# Patient Record
Sex: FEMALE | Race: WHITE | Employment: OTHER | URBAN - METROPOLITAN AREA
[De-identification: names, ages, dates, MRNs, and addresses within clinical notes are randomized per-mention and may not be internally consistent; named-entity substitution may affect disease eponyms.]

---

## 2017-12-26 ENCOUNTER — OFFICE VISIT (OUTPATIENT)
Dept: ORTHOPEDIC SURGERY | Age: 67
End: 2017-12-26

## 2017-12-26 VITALS
WEIGHT: 239 LBS | SYSTOLIC BLOOD PRESSURE: 137 MMHG | DIASTOLIC BLOOD PRESSURE: 74 MMHG | BODY MASS INDEX: 40.8 KG/M2 | OXYGEN SATURATION: 98 % | HEIGHT: 64 IN | HEART RATE: 59 BPM

## 2017-12-26 DIAGNOSIS — M25.561 PAIN IN BOTH KNEES, UNSPECIFIED CHRONICITY: Primary | ICD-10-CM

## 2017-12-26 DIAGNOSIS — M25.562 PAIN IN BOTH KNEES, UNSPECIFIED CHRONICITY: Primary | ICD-10-CM

## 2017-12-26 PROBLEM — E66.01 OBESITY, MORBID (HCC): Status: ACTIVE | Noted: 2017-12-26

## 2017-12-26 RX ORDER — MELOXICAM 15 MG/1
15 TABLET ORAL DAILY
Qty: 30 TAB | Refills: 1 | Status: SHIPPED | OUTPATIENT
Start: 2017-12-26 | End: 2018-05-15 | Stop reason: SDUPTHER

## 2017-12-26 NOTE — PROGRESS NOTES
HISTORY OF PRESENT ILLNESS: Ms. Nataly Jain is a 59-year-old female who is here for consultation regarding bilateral knee pain. She points to pain in both knees. She thinks her right knee may be a little bit worse than her left. She has had some pain with walking, as well as going up and down steps. She states when she gets to a curb she sometimes has to lean on her grandchildren to get up and down the curb. She points to pain in the anterior, as well as medial and lateral aspect of both knees. She has not noticed swelling of her knees. There is no history of trauma. She has not noticed any deformity of her knees. She does utilize a cane intermittently and currently has a cane in her car if she needs it. She does not wear a brace on her knees. She has been taking some Tylenol for discomfort, which she thinks helps just a little bit but not a lot. She denies mechanical locking symptoms of her knees. She has not had physical therapy for her knees. PHYSICAL EXAMINATION:  Reveals an overweight, 59-year-old female in mild discomfort. She ambulates with a slight waddling gait. She does not use any ambulatory assist. With reference to her right knee, she does not have any significant deformity of the right knee. She has a fairly good range of motion of the right knee but lacks about 3 to 4? of full active extension of her right knee. She has further flexion to about 130? Dixie Riser She has palpable medial and lateral joint line tenderness. However, Jazmines test is negative. She has crepitus in the patellofemoral area of the right knee with flexion/extension. She has good collateral as well as cruciate ligament stability of the right knee. Lachman test is negative. She has no opening with varus and valgus stress testing of her right knee. With reference to her left knee, she has almost full extension of her left knee and has further flexion to about 130 to 135? Dixie Riser She has no obvious deformity of her left knee.  She has palpable medial and lateral joint line tenderness. However, Jazmines test is negative. She has good collateral as well as cruciate ligament stability of the left knee. Lachman test is negative. She has no opening medially or laterally to varus or valgus stress testing. She has mild crepitus in the patellofemoral area of the left knee with flexion/extension. Patellar tracking is satisfactory bilaterally. Clinical examination of both hips reveals that she has a good passive range of motion of both hips without discomfort. Right and left hip roll test are negative. Leg lengths are symmetrical in the supine position. RADIOGRAPHS: X-rays of both knees today reveal mild osteoarthritis of both knees. She has slight narrowing of the medial joint space on standing AP radiographs but still has adequate joint space remaining in the weightbearing portion of both knees. IMPRESSION: Mild osteoarthritis of both knees. RECOMMENDATIONS:  Treatment at this point remains symptomatic and conservative. She has not been on arthritis medicine yet. I will start her on Mobic 15 mg po q day with food. I would like to check her back in about to three to four weeks to see if this medicine has helped her. I may recommend also a course of physical therapy to try to strengthen her legs so that she is able to go up and down curbs and steps a little bit easier also. Ultimately, she may be a candidate for injections of her knees if her symptoms do not improve. There is no indication at this point for surgery with reference to her knees. All of her questions were answered to her satisfaction today.                     Vitals:    12/26/17 1140   BP: 137/74   Pulse: (!) 59   SpO2: 98%   Weight: 239 lb (108.4 kg)   Height: 5' 4\" (1.626 m)       Patient Active Problem List   Diagnosis Code    Obesity, morbid ([facility] [address] E66.01     Patient Active Problem List    Diagnosis Date Noted    Obesity, morbid ([facility] [address] 12/26/2017     Current Outpatient Prescriptions   Medication Sig Dispense Refill    VITAMIN D2 50,000 unit capsule       bisoprolol-hydrochlorothiazide (ZIAC) 10-6.25 mg per tablet       levothyroxine (SYNTHROID) 175 mcg tablet       sertraline (ZOLOFT) 100 mg tablet       letrozole (FEMARA) 2.5 mg tablet Take 2.5 mg by mouth daily. No Known Allergies  Past Medical History:   Diagnosis Date    Breast cancer ([facility] [address]      Past Surgical History:   Procedure Laterality Date    HX ANKLE FRACTURE TX      HX GASTRIC BYPASS      HX PARTIAL THYROIDECTOMY      SURGERY OF BREAST CAPSULE       History reviewed. No pertinent family history.   Social History   Substance Use Topics    Smoking status: Never Smoker    Smokeless tobacco: Never Used    Alcohol use 0.0 oz/week     0 Standard drinks or equivalent per week

## 2017-12-26 NOTE — PATIENT INSTRUCTIONS
Follow up in 3-4 weeks  [facility]           Joint Pain: Care Instructions  Your Care Instructions    Many people have small aches and pains from overuse or injury to muscles and joints. Joint injuries often happen during sports or recreation, work tasks, or projects around the home. An overuse injury can happen when you put too much stress on a joint or when you do an activity that stresses the joint over and over, such as using the computer or rowing a boat. You can take action at home to help your muscles and joints get better. You should feel better in 1 to 2 weeks, but it can take 3 months or more to heal completely. Follow-up care is a key part of your treatment and safety. Be sure to make and go to all appointments, and call your doctor if you are having problems. It's also a good idea to know your test results and keep a list of the medicines you take. How can you care for yourself at home? · Do not put weight on the injured joint for at least a day or two. · For the first day or two after an injury, do not take hot showers or baths, and do not use hot packs. The heat could make swelling worse. · Put ice or a cold pack on the sore joint for 10 to 20 minutes at a time. Try to do this every 1 to 2 hours for the next 3 days (when you are awake) or until the swelling goes down. Put a thin cloth between the ice and your skin. · Wrap the injury in an elastic bandage. Do not wrap it too tightly because this can cause more swelling. · Prop up the sore joint on a pillow when you ice it or anytime you sit or lie down during the next 3 days. Try to keep it above the level of your heart. This will help reduce swelling. · Take an over-the-counter pain medicine, such as acetaminophen (Tylenol), ibuprofen (Advil, Motrin), or naproxen (Aleve). Read and follow all instructions on the label. · After 1 or 2 days of rest, begin moving the joint gently.  While the joint is still healing, you can begin to exercise using activities that do not strain or hurt the painful joint. When should you call for help? Call your doctor now or seek immediate medical care if:  ? · You have signs of infection, such as:  ¨ Increased pain, swelling, warmth, and redness. ¨ Red streaks leading from the joint. ¨ A fever. ? Watch closely for changes in your health, and be sure to contact your doctor if:  ? · Your movement or symptoms are not getting better after 1 to 2 weeks of home treatment. Where can you learn more? Go to http://derrell-nery.info/. Enter P205 in the search box to learn more about \"Joint Pain: Care Instructions. \"  Current as of: March 21, 2017  Content Version: 11.4  © 7489-8788 [employer]. Care instructions adapted under license by Is[employer] (which disclaims liability or warranty for this information). If you have questions about a medical condition or this instruction, always ask your healthcare professional. [place] [address] any warranty or liability for your use of this information.

## 2017-12-26 NOTE — MR AVS SNAPSHOT
Visit Information Date & Time Provider Department Dept. Phone Encounter #  
 12/26/2017 11:30 AM Elizabeth Trevizo MD 4 [facility], [address] and [facility] [place] [phone] [id number] Upcoming Health Maintenance Date Due Hepatitis C Screening 1950 DTaP/Tdap/Td series (1 - Tdap) 8/11/1971 FOBT Q 1 YEAR AGE 50-75 8/11/2000 ZOSTER VACCINE AGE 60> 6/11/2010 GLAUCOMA SCREENING Q2Y 8/11/2015 OSTEOPOROSIS SCREENING (DEXA) 8/11/2015 Pneumococcal 65+ Low/Medium Risk (1 of 2 - PCV13) 8/11/2015 MEDICARE YEARLY EXAM 8/11/2015 Influenza Age 5 to Adult 8/1/2017 Allergies as of 12/26/2017  Review Complete On: 12/26/2017 By: Elizabeth Trevizo MD  
 No Known Allergies Current Immunizations  Never Reviewed No immunizations on file. Not reviewed this visit You Were Diagnosed With   
  
 Codes Comments Pain in both knees, unspecified chronicity    -  Primary ICD-10-CM: M25.561, M25.562 ICD-9-CM: 719.46 Vitals BP Pulse Height(growth percentile) Weight(growth percentile) SpO2 BMI  
 137/74 (BP 1 Location: Left arm, BP Patient Position: Sitting) (!) 59 5' 4\" (1.626 m) 239 lb (108.4 kg) 98% 41.02 kg/m2 OB Status Smoking Status Menopause Never Smoker BMI and BSA Data Body Mass Index Body Surface Area 41.02 kg/m 2 2.21 m 2 Preferred Pharmacy Pharmacy Name Phone [id number] Aleksandra [address] Your Updated Medication List  
  
   
This list is accurate as of: 12/26/17 12:10 PM.  Always use your most recent med list.  
  
  
  
  
 bisoprolol-hydroCHLOROthiazide 10-6.25 mg per tablet Commonly known as:  [facility] 2.5 mg tablet Generic drug:  letrozole Take 2.5 mg by mouth daily. levothyroxine 175 mcg tablet Commonly known as:  SYNTHROID  
  
 meloxicam 15 mg tablet Commonly known as:  MOBIC Take 1 Tab by mouth daily. sertraline 100 mg tablet Commonly known as:  ZOLOFT  
  
 VITAMIN D2 50,000 unit capsule Generic drug:  ergocalciferol Prescriptions Sent to Pharmacy Refills  
 meloxicam (MOBIC) 15 mg tablet 1 Sig: Take 1 Tab by mouth daily. Class: Normal  
 [facility], [address] [place] [address] #: [phone] Route: Oral  
  
We Performed the Following AMB POC XRAY, KNEE; 1/2 VIEWS [75338 CPT(R)] AMB POC XRAY, KNEE; 1/2 VIEWS [86748 CPT(R)] Patient Instructions Follow up in 3-4 weeks Rx for Mobic called into [facility] 
  
 
 
 
Joint Pain: Care Instructions Your Care Instructions Many people have small aches and pains from overuse or injury to muscles and joints. Joint injuries often happen during sports or recreation, work tasks, or projects around the home. An overuse injury can happen when you put too much stress on a joint or when you do an activity that stresses the joint over and over, such as using the computer or rowing a boat. You can take action at home to help your muscles and joints get better. You should feel better in 1 to 2 weeks, but it can take 3 months or more to heal completely. Follow-up care is a key part of your treatment and safety. Be sure to make and go to all appointments, and call your doctor if you are having problems. It's also a good idea to know your test results and keep a list of the medicines you take. How can you care for yourself at home? · Do not put weight on the injured joint for at least a day or two. · For the first day or two after an injury, do not take hot showers or baths, and do not use hot packs. The heat could make swelling worse. · Put ice or a cold pack on the sore joint for 10 to 20 minutes at a time. Try to do this every 1 to 2 hours for the next 3 days (when you are awake) or until the swelling goes down. Put a thin cloth between the ice and your skin. · Wrap the injury in an elastic bandage. Do not wrap it too tightly because this can cause more swelling. · Prop up the sore joint on a pillow when you ice it or anytime you sit or lie down during the next 3 days. Try to keep it above the level of your heart. This will help reduce swelling. · Take an over-the-counter pain medicine, such as acetaminophen (Tylenol), ibuprofen (Advil, Motrin), or naproxen (Aleve). Read and follow all instructions on the label. · After 1 or 2 days of rest, begin moving the joint gently. While the joint is still healing, you can begin to exercise using activities that do not strain or hurt the painful joint. When should you call for help? Call your doctor now or seek immediate medical care if: 
? · You have signs of infection, such as: 
¨ Increased pain, swelling, warmth, and redness. ¨ Red streaks leading from the joint. ¨ A fever. ? Watch closely for changes in your health, and be sure to contact your doctor if: 
? · Your movement or symptoms are not getting better after 1 to 2 weeks of home treatment. Where can you learn more? Go to http://derrell-nery.info/. Enter P205 in the search box to learn more about \"Joint Pain: Care Instructions. \" Current as of: March 21, 2017 Content Version: 11.4 © 1354-1805 [employer] Care instructions adapted under license by [employer] (which disclaims liability or warranty for this information). If you have questions about a medical condition or this instruction, always ask your healthcare professional. [place] [address] any warranty or liability for your use of this information. Introducing \[facility]\"" & HEALTH SERVICES! [employer] introduces Backand patient portal. Now you can access parts of your medical record, email your doctor's office, and request medication refills online. 1. In your internet browser, go to [email or web address] 2. Click on the First Time User? Click Here link in the Sign In box. You will see the New Member Sign Up page. 3. Enter your IdentityForge Access Code exactly as it appears below. You will not need to use this code after youve completed the sign-up process. If you do not sign up before the expiration date, you must request a new code. · IdentityForge Access Code: [email or web address] Expires: 3/26/2018 11:27 AM 
 
4. Enter the last four digits of your Social Security Number (xxxx) and Date of Birth (mm/dd/yyyy) as indicated and click Submit. You will be taken to the next sign-up page. 5. Create a IdentityForge ID. This will be your IdentityForge login ID and cannot be changed, so think of one that is secure and easy to remember. 6. Create a IdentityForge password. You can change your password at any time. 7. Enter your Password Reset Question and Answer. This can be used at a later time if you forget your password. 8. Enter your e-mail address. You will receive e-mail notification when new information is available in [address] 9. Click Sign Up. You can now view and download portions of your medical record. 10. Click the Download Summary menu link to download a portable copy of your medical information. If you have questions, please visit the Frequently Asked Questions section of the IdentityForge website. Remember, IdentityForge is NOT to be used for urgent needs. For medical emergencies, dial 911. Now available from your iPhone and Android! Please provide this summary of care documentation to your next provider. Your primary care clinician is listed as Gurpreet Mcgowan. If you have any questions after today's visit, please call [phone].

## 2018-01-16 ENCOUNTER — OFFICE VISIT (OUTPATIENT)
Dept: ORTHOPEDIC SURGERY | Age: 68
End: 2018-01-16

## 2018-01-16 VITALS — BODY MASS INDEX: 42.78 KG/M2 | WEIGHT: 250.6 LBS | HEIGHT: 64 IN | RESPIRATION RATE: 18 BRPM

## 2018-01-16 DIAGNOSIS — M17.0 ARTHRITIS OF BOTH KNEES: Primary | ICD-10-CM

## 2018-01-16 RX ORDER — HYDROCHLOROTHIAZIDE 12.5 MG/1
TABLET ORAL
Refills: 3 | COMMUNITY
Start: 2017-11-05

## 2018-01-16 RX ORDER — ALBUTEROL SULFATE 90 UG/1
AEROSOL, METERED RESPIRATORY (INHALATION)
Refills: 0 | COMMUNITY
Start: 2017-12-14

## 2018-01-16 RX ORDER — CITALOPRAM 20 MG/1
TABLET, FILM COATED ORAL
Refills: 3 | COMMUNITY
Start: 2017-11-09

## 2018-01-16 NOTE — MR AVS SNAPSHOT
[address] [facility] 
 
 
 [address] [facility] 
[phone] Patient: Emmanuel Bound MRN: [id number] IUD:[id number] Visit Information Date & Time Provider Department Dept. Phone Encounter #  
 1/16/2018  8:30 AM Lucy Larose MD 4 [facility], [address] and [facility] [phone] [id number] Upcoming Health Maintenance Date Due Hepatitis C Screening 1950 DTaP/Tdap/Td series (1 - Tdap) 8/11/1971 BREAST CANCER SCRN MAMMOGRAM 8/11/2000 FOBT Q 1 YEAR AGE 50-75 8/11/2000 ZOSTER VACCINE AGE 60> 6/11/2010 GLAUCOMA SCREENING Q2Y 8/11/2015 OSTEOPOROSIS SCREENING (DEXA) 8/11/2015 Pneumococcal 65+ Low/Medium Risk (1 of 2 - PCV13) 8/11/2015 MEDICARE YEARLY EXAM 8/11/2015 Influenza Age 5 to Adult 8/1/2017 Allergies as of 1/16/2018  Review Complete On: 1/16/2018 By: Jelani Angel LPN No Known Allergies Current Immunizations  Never Reviewed No immunizations on file. Not reviewed this visit You Were Diagnosed With   
  
 Codes Comments Arthritis of both knees    -  Primary ICD-10-CM: M17.0 ICD-9-CM: 716.96 Vitals Resp Height(growth percentile) Weight(growth percentile) BMI OB Status Smoking Status 18 5' 4\" (1.626 m) 250 lb 9.6 oz (113.7 kg) 43.02 kg/m2 Menopause Never Smoker BMI and BSA Data Body Mass Index Body Surface Area 43.02 kg/m 2 2.27 m 2 Preferred Pharmacy [facility] Name Phone [id number] [address] [address] Your Updated Medication List  
  
   
This list is accurate as of: 1/16/18  8:52 AM.  Always use your most recent med list.  
  
  
  
  
 bisoprolol-hydroCHLOROthiazide 10-6.25 mg per tablet Commonly known as:  [facility] Take 1 Tab by mouth daily. citalopram 20 mg tablet Commonly known as:  CELEXA  
TAKE 1 TABLET BY MOUTH ONCE DAILY  
  
 FEMARA 2.5 mg tablet Generic drug:  letrozole Take 2.5 mg by mouth daily. hydroCHLOROthiazide 12.5 mg tablet Commonly known as:  HYDRODIURIL  
TAKE 1 TAB BY MOUTH DAILY AS NEEDED FOR LEG SWELLING  
  
 levothyroxine 175 mcg tablet Commonly known as:  SYNTHROID Take 175 mcg by mouth Daily (before breakfast). meloxicam 15 mg tablet Commonly known as:  MOBIC Take 1 Tab by mouth daily. PROAIR HFA 90 mcg/actuation inhaler Generic drug:  albuterol INHALE 2 PUFFS BY MOUTH EVERY 4-6 HOURS AS NEEDED FOR WHEEZING AND COUGH  
  
 sertraline 100 mg tablet Commonly known as:  ZOLOFT Take 100 mg by mouth daily. VITAMIN D2 50,000 unit capsule Generic drug:  ergocalciferol Take 50,000 Units by mouth every seven (7) days. We Performed the Following REFERRAL TO PHYSICAL THERAPY [RWQ89 Custom] Comments:  
 Eval/Treat Bilateral Knee PT, Quad strengthening, ROM, and HEP Referral Information Referral ID Referred By Referred To  
  
 [id number] [facility] [place] ELIAS Not Available Visits Status Start Date End Date 1 New Request 1/16/18 1/16/19 If your referral has a status of pending review or denied, additional information will be sent to support the outcome of this decision. Introducing [facility] & HEALTH SERVICES! [facility] introduces PureForge patient portal. Now you can access parts of your medical record, email your doctor's office, and request medication refills online. 1. In your internet browser, go to [email or web address] 2. Click on the First Time User? Click Here link in the Sign In box. You will see the New Member Sign Up page. 3. Enter your PureForge Access Code exactly as it appears below. You will not need to use this code after youve completed the sign-up process. If you do not sign up before the expiration date, you must request a new code. · PureForge Access Code: [email or web address] Expires: 3/26/2018 11:27 AM 
 
 4. Enter the last four digits of your Social Security Number (xxxx) and Date of Birth (mm/dd/yyyy) as indicated and click Submit. You will be taken to the next sign-up page. 5. Create a CureDM ID. This will be your CureDM login ID and cannot be changed, so think of one that is secure and easy to remember. 6. Create a CureDM password. You can change your password at any time. 7. Enter your Password Reset Question and Answer. This can be used at a later time if you forget your password. 8. Enter your e-mail address. You will receive e-mail notification when new information is available in [address] 9. Click Sign Up. You can now view and download portions of your medical record. 10. Click the Download Summary menu link to download a portable copy of your medical information. If you have questions, please visit the Frequently Asked Questions section of the CureDM website. Remember, CureDM is NOT to be used for urgent needs. For medical emergencies, dial 911. Now available from your iPhone and Android! Please provide this summary of care documentation to your next provider. Your primary care clinician is listed as Kaleb Stanford. If you have any questions after today's visit, please call [phone].

## 2018-01-16 NOTE — PROGRESS NOTES
ISTORY OF PRESENT ILLNESS: Ms. Oscar Scott is here for follow-up with reference to both knees. Per my last note, she has mild osteoarthritis in both knees. She was having difficulty getting up and down curbs and noted pain in both knees on a daily basis. I did start her on some Mobic medication, 50 mg po every day. She feels much better on this medication. She is able to get up and down on curbs a little bit better with her grandchildren. She still notes some discomfort. She is currently not utilizing any ambulatory assist.    PHYSICAL EXAMINATION:  Reveals a significantly overweight, 77-year-old female in no obvious discomfort. She currently ambulates without an antalgic gait. With reference to her right knee, she has a slight valgus deformity in the right knee which is corrected in neutral position passively. She has palpable medial joint line tenderness. She has good collateral, as well as cruciate ligament stability in the right knee. Lachman test is negative. She has no opening to varus or valgus stress testing in full extension to 30º of flexion. Posterior drawer sign is negative. She has no right calf tenderness, but does have some right calf swelling due to retained fluid. With reference to her left knee, she does not have a deformity of the left knee. She has slight palpable medial joint line tenderness. She has crepitus of the patellofemoral area of her left knee with flexion and extension. She has good collateral, as well as cruciate ligament stability of the left knee. Jazmine's test is negative bilaterally. Lachman test is negative. She has no opening to varus or valgus stress testing of her left knee. Posterior drawer test is negative. She has no left calf tenderness, but has mild left calf swelling. Neurovascular testing is intact in the lower extremities distally. She has good passive range of motion of both hips without discomfort.     RADIOGRAPHS: Her previous x-rays revealed mild osteoarthritis of both knees, but still adequate joint space remaining. IMPRESSION: Osteoarthritis of both knees, mild. RECOMMENDATIONS:  At this point the Mobic medication has helped her. I have recommended using a little as possible that allows her to function on a daily basis. She may cut these pills in half and she does have a pill cutter. She may also otherwise try one pill every other day. She is status post gastric bypass surgery and I do not want her on this medicine long-term. She will follow-up with her PCP regarding appropriate lab tests. In addition I have started her in a course of physical therapy for strengthening the lower extremities. I have also reminded her of the importance of weight reduction in preservation of her knees and lower extremity joints. All of her questions were answered today. She will return to see me in about two to three months. Vitals:    01/16/18 0843   Resp: 18   Weight: 250 lb 9.6 oz (113.7 kg)   Height: 5' 4\" (1.626 m)   PainSc:   2   PainLoc: Knee       Patient Active Problem List   Diagnosis Code    Obesity, morbid ([facility] E66.01     Patient Active Problem List    Diagnosis Date Noted    Obesity, morbid ([facility] [address] 12/26/2017     Current Outpatient Prescriptions   Medication Sig Dispense Refill    PROAIR HFA 90 mcg/actuation inhaler INHALE 2 PUFFS BY MOUTH EVERY 4-6 HOURS AS NEEDED FOR WHEEZING AND COUGH  0    citalopram (CELEXA) 20 mg tablet TAKE 1 TABLET BY MOUTH ONCE DAILY  3    hydroCHLOROthiazide (HYDRODIURIL) 12.5 mg tablet TAKE 1 TAB BY MOUTH DAILY AS NEEDED FOR LEG SWELLING  3    meloxicam (MOBIC) 15 mg tablet Take 1 Tab by mouth daily. 30 Tab 1    VITAMIN D2 50,000 unit capsule Take 50,000 Units by mouth every seven (7) days.  bisoprolol-hydrochlorothiazide (ZIAC) 10-6.25 mg per tablet Take 1 Tab by mouth daily.  levothyroxine (SYNTHROID) 175 mcg tablet Take 175 mcg by mouth Daily (before breakfast).       sertraline (ZOLOFT) 100 mg tablet Take 100 mg by mouth daily.  letrozole (FEMARA) 2.5 mg tablet Take 2.5 mg by mouth daily.        No Known Allergies  Past Medical History:   Diagnosis Date    Breast cancer ([facility] [address]      Past Surgical History:   Procedure Laterality Date    HX ANKLE FRACTURE TX      HX GASTRIC BYPASS      HX PARTIAL THYROIDECTOMY      SURGERY OF BREAST CAPSULE       Family History   Problem Relation Age of Onset    No Known Problems Mother     No Known Problems Father      Social History   Substance Use Topics    Smoking status: Never Smoker    Smokeless tobacco: Never Used    Alcohol use 0.0 oz/week     0 Standard drinks or equivalent per week

## 2018-02-08 ENCOUNTER — TELEPHONE (OUTPATIENT)
Dept: ORTHOPEDIC SURGERY | Age: 68
End: 2018-02-08

## 2018-02-08 DIAGNOSIS — M25.551 RIGHT HIP PAIN: Primary | ICD-10-CM

## 2018-02-08 NOTE — TELEPHONE ENCOUNTER
PATIENT CALLED FOR DR. Dean Franco. PATIENT SAID THAT DR. MITCHELL HAS HER IN PHYSICAL THERAPY FOR HER KNEES. PATIENT ALSO SAID THAT SHE HAD MENTIONED HER PAIN IN HER TAIL BONE AND RT HIP. PATIENT IS ASKING IF SHE CAN HAVE THERAPY FOR HER TAIL BONE AND RT HIP AS WELL. IF SO SHE NEEDS A NEW ORDER SENT TO [facility]. PATIENT SAID SHE HAS AN APPOINTMENT TOMORROW MORNING WITH [facility]. SHE WOULD LIKE THEM TO GET THE NEW ORDER BEFORE SHE GOES TO HER APPOINTMENT. Nathan Geiger TEL. [phone]. PATIENT TEL. [phone].

## 2018-02-08 NOTE — TELEPHONE ENCOUNTER
Ok to send Pt order for hip strain--evaluate and treat. We dont really order pt for \"tail bone\" thanks!

## 2018-02-20 ENCOUNTER — OFFICE VISIT (OUTPATIENT)
Dept: ORTHOPEDIC SURGERY | Age: 68
End: 2018-02-20

## 2018-02-20 VITALS
BODY MASS INDEX: 41.83 KG/M2 | WEIGHT: 245 LBS | DIASTOLIC BLOOD PRESSURE: 64 MMHG | HEART RATE: 64 BPM | HEIGHT: 64 IN | SYSTOLIC BLOOD PRESSURE: 141 MMHG

## 2018-02-20 DIAGNOSIS — M17.0 ARTHRITIS OF BOTH KNEES: Primary | ICD-10-CM

## 2018-02-20 RX ORDER — MELOXICAM 15 MG/1
15 TABLET ORAL
Qty: 90 TAB | Refills: 0 | Status: SHIPPED | OUTPATIENT
Start: 2018-02-20 | End: 2018-07-03 | Stop reason: ALTCHOICE

## 2018-02-20 NOTE — PATIENT INSTRUCTIONS
You should follow up PRN. If your condition worsens, please contact our office. Learning About Bariatric Surgery  What is bariatric surgery? Bariatric surgery is surgery to help you lose weight. This type of surgery is only used for people who are very overweight and have not been able to lose weight with diet and exercise. This surgery makes the stomach smaller. Some types of surgery also change the connection between your stomach and intestines. How is bariatric surgery done? Bariatric surgery may be either \"open\" or \"laparoscopic. \" Open surgery is done through a large cut (incision) in the belly. Laparoscopic surgery is done through several small cuts. The doctor puts a lighted tube, or scope, and other surgical tools through small cuts in your belly. The doctor is able to see your organs with the scope. There are different types of bariatric surgery. Gastric sleeve surgery  The surgery is usually done through several small incisions in the belly. The doctor removes more than half of your stomach. This leaves a thin sleeve, or tube, that is about the size of a banana. Because part of your stomach has been removed, this can't be reversed. Ashanti-en-Y gastric bypass surgery  Ashanti-en-Y (say \"armin-en-why\") surgery changes the connection between the stomach and the intestines. The doctor separates a section of your stomach from the rest of your stomach. This makes a small pouch. The new pouch will hold the food you eat. The doctor connects the stomach pouch to the middle part of the small intestine. Gastric banding surgery  The surgery is usually done through several small incisions in the belly. The doctor wraps a band around the upper part of the stomach. This creates a small pouch. The small size of the pouch means that you will get full after you eat just a small amount of food. The doctor can inflate or deflate the band to adjust the size.  This lets the doctor adjust how quickly food passes from the new pouch into the stomach. It does not change the connection between the stomach and the intestines. What can you expect after the surgery? You may stay in the hospital for one or more days after the surgery. How long you stay depends on the type of surgery you had. Most people need 2 to 4 weeks before they are ready to get back to their usual routine. For the first 2 to 6 weeks after surgery, you probably will need to follow a liquid or soft diet. Bit by bit, you will be able to eat more solid foods. Your doctor may advise you to work with a dietitian. This way you'll be sure to get enough protein, vitamins, and minerals while you are losing weight. Even with a healthy diet, you may need to take vitamin and mineral supplements. After surgery, you will not be able to eat very much at one time. You will get full quickly. Try not to eat too much at one time or eat foods that are high in fat or sugar. If you do, you may vomit, get stomach pain, or have diarrhea. You probably will lose weight very quickly in the first few months after surgery. As time goes on, your weight loss will slow down. You will have regular doctor visits to check how you are doing. Think of bariatric surgery as a tool to help you lose weight. It isn't an instant fix. You will still need to eat a healthy diet and get regular exercise. This will help you reach your weight goal and avoid regaining the weight you lose. Follow-up care is a key part of your treatment and safety. Be sure to make and go to all appointments, and call your doctor if you are having problems. It's also a good idea to know your test results and keep a list of the medicines you take. Where can you learn more? Go to http://derrell-nery.info/. Enter G469 in the search box to learn more about \"Learning About Bariatric Surgery. \"  Current as of: October 13, 2016  Content Version: 11.4  © 6266-4975 Healthwise, Incorporated.  Care instructions adapted under license by [address] (which disclaims liability or warranty for this information). If you have questions about a medical condition or this instruction, always ask your healthcare professional. [address] any warranty or liability for your use of this information.

## 2018-02-20 NOTE — PROGRESS NOTES
HISTORY OF PRESENT ILLNESS: Ms. Phuc Sylvester is here for follow-up with reference to her knees. I first saw her on January 16, 2018. At that time she was found to have some mild osteoarthritis of both knees. She had difficulty going up and down curbs and playing with her five grandchildren. I started her on some Mobic medication which helped a lot with her discomfort. In addition the last time I saw she was started on some physical therapy which she states she likes and she is improving as far as strength in the lower extremities. She still has some difficulty going up some steeper curbs, but overall is doing well. She has not been very successful regarding weight loss as was recommended. PHYSICAL EXAMINATION:  Reveals a morbidly obese, 26-year-old female in no obvious discomfort. She moves slowly on and off the examination table. With reference to her knees she has a very good functional range of motion of both knees without significant discomfort. She has minimal crepitus in the patellofemoral area of both knees with flexion and extension. She has no palpable medial or lateral joint line tenderness bilaterally. She has good collateral, as well as cruciate ligament stability to both knees. She has no calf tenderness or swelling. Neurovascular testing is intact to the lower extremities distally. She has some chronic vascular changes to the right lower extremity but no acute cellulitis at this point. IMPRESSION: Mild osteoarthritis, both knees. RECOMMENDATIONS:  She is to continue on the Mobic, but on an as-needed basis only. She has cut it back to every other day and she may now use it just as needed. In addition she will complete a course of physical therapy. Again I have stressed with her the importance of weight reduction.   We will give her a brochure and see if she will follow a weight reduction program.    In addition I have told her to keep an eye on the right lower extremity and make sure this does not turn into a cellulitis. She has an appointment to see her family physician within the next month. All of her questions were answered today. She will return to see me on a prn basis. Vitals:    02/20/18 0845   BP: 141/64   Pulse: 64   Weight: 111.1 kg (245 lb)   Height: 5' 4\" (1.626 m)   PainSc:   0 - No pain   PainLoc: Knee       Patient Active Problem List   Diagnosis Code    Obesity, morbid ([facility]) E66.01     Patient Active Problem List    Diagnosis Date Noted    Obesity, morbid ([facility] 12/26/2017     Current Outpatient Prescriptions   Medication Sig Dispense Refill    PROAIR HFA 90 mcg/actuation inhaler INHALE 2 PUFFS BY MOUTH EVERY 4-6 HOURS AS NEEDED FOR WHEEZING AND COUGH  0    hydroCHLOROthiazide (HYDRODIURIL) 12.5 mg tablet TAKE 1 TAB BY MOUTH DAILY AS NEEDED FOR LEG SWELLING  3    meloxicam (MOBIC) 15 mg tablet Take 1 Tab by mouth daily. 30 Tab 1    VITAMIN D2 50,000 unit capsule Take 50,000 Units by mouth every seven (7) days.  bisoprolol-hydrochlorothiazide (ZIAC) 10-6.25 mg per tablet Take 1 Tab by mouth daily.  levothyroxine (SYNTHROID) 175 mcg tablet Take 175 mcg by mouth Daily (before breakfast).  sertraline (ZOLOFT) 100 mg tablet Take 100 mg by mouth daily.  letrozole (FEMARA) 2.5 mg tablet Take 2.5 mg by mouth daily.       citalopram (CELEXA) 20 mg tablet TAKE 1 TABLET BY MOUTH ONCE DAILY  3     No Known Allergies  Past Medical History:   Diagnosis Date    Breast cancer ([facility]      Past Surgical History:   Procedure Laterality Date    HX ANKLE FRACTURE TX      HX GASTRIC BYPASS      HX PARTIAL THYROIDECTOMY      SURGERY OF BREAST CAPSULE       Family History   Problem Relation Age of Onset    No Known Problems Mother     No Known Problems Father      Social History   Substance Use Topics    Smoking status: Never Smoker    Smokeless tobacco: Never Used    Alcohol use 0.0 oz/week     0 Standard drinks or equivalent per week

## 2018-02-20 NOTE — MR AVS SNAPSHOT
[address] [facility] 
 
 
 [address] [facility] 
[phone] Patient: Mackenzie Kilpatrick MRN: [id number] Visit Information Date & Time Provider Department Dept. Phone Encounter #  
 2/20/2018  8:40 AM Ashvin Sarmiento MD [facility], [address] [facility] [phone] [id number] Follow-up Instructions Return if symptoms worsen or fail to improve. Upcoming Health Maintenance Date Due Hepatitis C Screening 1950 DTaP/Tdap/Td series (1 - Tdap) 8/11/1971 BREAST CANCER SCRN MAMMOGRAM 8/11/2000 FOBT Q 1 YEAR AGE 50-75 8/11/2000 ZOSTER VACCINE AGE 60> 6/11/2010 GLAUCOMA SCREENING Q2Y 8/11/2015 OSTEOPOROSIS SCREENING (DEXA) 8/11/2015 Pneumococcal 65+ Low/Medium Risk (1 of 2 - PCV13) 8/11/2015 MEDICARE YEARLY EXAM 8/11/2015 Influenza Age 5 to Adult 8/1/2017 Allergies as of 2/20/2018  Review Complete On: 2/20/2018 By: Ashvin Sarmiento MD  
 No Known Allergies Current Immunizations  Never Reviewed No immunizations on file. Not reviewed this visit You Were Diagnosed With   
  
 Codes Comments Arthritis of both knees    -  Primary ICD-10-CM: M17.0 ICD-9-CM: 716.96 Vitals BP Pulse Height(growth percentile) Weight(growth percentile) BMI OB Status 141/64 64 5' 4\" (1.626 m) 245 lb (111.1 kg) 42.05 kg/m2 Menopause Smoking Status Never Smoker Vitals History BMI and BSA Data Body Mass Index Body Surface Area 42.05 kg/m 2 2.24 m 2 Preferred Pharmacy Pharmacy Name Phone [id number] [address] Your Updated Medication List  
  
   
This list is accurate as of: 2/20/18  9:08 AM.  Always use your most recent med list.  
  
  
  
  
 bisoprolol-hydroCHLOROthiazide 10-6.25 mg per tablet Commonly known as:  [facility] Take 1 Tab by mouth daily. citalopram 20 mg tablet Commonly known as:  CELEXA  
TAKE 1 TABLET BY MOUTH ONCE DAILY  
  
 FEMARA 2.5 mg tablet Generic drug:  letrozole Take 2.5 mg by mouth daily. hydroCHLOROthiazide 12.5 mg tablet Commonly known as:  HYDRODIURIL  
TAKE 1 TAB BY MOUTH DAILY AS NEEDED FOR LEG SWELLING  
  
 levothyroxine 175 mcg tablet Commonly known as:  SYNTHROID Take 175 mcg by mouth Daily (before breakfast). * meloxicam 15 mg tablet Commonly known as:  MOBIC Take 1 Tab by mouth daily. * meloxicam 15 mg tablet Commonly known as:  MOBIC Take 1 Tab by mouth daily (with breakfast). PROAIR HFA 90 mcg/actuation inhaler Generic drug:  albuterol INHALE 2 PUFFS BY MOUTH EVERY 4-6 HOURS AS NEEDED FOR WHEEZING AND COUGH  
  
 sertraline 100 mg tablet Commonly known as:  ZOLOFT Take 100 mg by mouth daily. VITAMIN D2 50,000 unit capsule Generic drug:  ergocalciferol Take 50,000 Units by mouth every seven (7) days. * Notice: This list has 2 medication(s) that are the same as other medications prescribed for you. Read the directions carefully, and ask your doctor or other care provider to review them with you. Prescriptions Sent to Pharmacy Refills  
 meloxicam (MOBIC) 15 mg tablet 0 Sig: Take 1 Tab by mouth daily (with breakfast). Class: Normal  
 Pharmacy: [facility]/pharmacy [address] [place] [address] #: [phone] Route: Oral  
  
Follow-up Instructions Return if symptoms worsen or fail to improve. Patient Instructions You should follow up PRN. If your condition worsens, please contact our office. Learning About Bariatric Surgery What is bariatric surgery? Bariatric surgery is surgery to help you lose weight. This type of surgery is only used for people who are very overweight and have not been able to lose weight with diet and exercise. This surgery makes the stomach smaller. Some types of surgery also change the connection between your stomach and intestines. How is bariatric surgery done? Bariatric surgery may be either \"open\" or \"laparoscopic. \" Open surgery is done through a large cut (incision) in the belly. Laparoscopic surgery is done through several small cuts. The doctor puts a lighted tube, or scope, and other surgical tools through small cuts in your belly. The doctor is able to see your organs with the scope. There are different types of bariatric surgery. Gastric sleeve surgery The surgery is usually done through several small incisions in the belly. The doctor removes more than half of your stomach. This leaves a thin sleeve, or tube, that is about the size of a banana. Because part of your stomach has been removed, this can't be reversed. Ashanti-en-Y gastric bypass surgery Ashanti-en-Y (say \"armin-en-why\") surgery changes the connection between the stomach and the intestines. The doctor separates a section of your stomach from the rest of your stomach. This makes a small pouch. The new pouch will hold the food you eat. The doctor connects the stomach pouch to the middle part of the small intestine. Gastric banding surgery The surgery is usually done through several small incisions in the belly. The doctor wraps a band around the upper part of the stomach. This creates a small pouch. The small size of the pouch means that you will get full after you eat just a small amount of food. The doctor can inflate or deflate the band to adjust the size. This lets the doctor adjust how quickly food passes from the new pouch into the stomach. It does not change the connection between the stomach and the intestines. What can you expect after the surgery? You may stay in the hospital for one or more days after the surgery. How long you stay depends on the type of surgery you had. Most people need 2 to 4 weeks before they are ready to get back to their usual routine. For the first 2 to 6 weeks after surgery, you probably will need to follow a liquid or soft diet. Bit by bit, you will be able to eat more solid foods. Your doctor may advise you to work with a dietitian. This way you'll be sure to get enough protein, vitamins, and minerals while you are losing weight. Even with a healthy diet, you may need to take vitamin and mineral supplements. After surgery, you will not be able to eat very much at one time. You will get full quickly. Try not to eat too much at one time or eat foods that are high in fat or sugar. If you do, you may vomit, get stomach pain, or have diarrhea. You probably will lose weight very quickly in the first few months after surgery. As time goes on, your weight loss will slow down. You will have regular doctor visits to check how you are doing. Think of bariatric surgery as a tool to help you lose weight. It isn't an instant fix. You will still need to eat a healthy diet and get regular exercise. This will help you reach your weight goal and avoid regaining the weight you lose. Follow-up care is a key part of your treatment and safety. Be sure to make and go to all appointments, and call your doctor if you are having problems. It's also a good idea to know your test results and keep a list of the medicines you take. Where can you learn more? Go to http://derrell-nery.info/. Enter G469 in the search box to learn more about \"Learning About Bariatric Surgery. \" Current as of: October 13, 2016 Content Version: 11.4 © 1255-8452 Healthwise, Incorporated. Care instructions adapted under license by [employer] (which disclaims liability or warranty for this information).  If you have questions about a medical condition or this instruction, always ask your healthcare professional. [employer] disclaims any warranty or liability for your use of this information. Introducing [facility]! [employer] introduces milog patient portal. Now you can access parts of your medical record, email your doctor's office, and request medication refills online. 1. In your internet browser, go to [email or web address][email or web address] 2. Click on the First Time User? Click Here link in the Sign In box. You will see the New Member Sign Up page. 3. Enter your milog Access Code exactly as it appears below. You will not need to use this code after youve completed the sign-up process. If you do not sign up before the expiration date, you must request a new code. · milog Access Code: [email or web address] Expires: 3/26/2018 11:27 AM 
 
4. Enter the last four digits of your Social Security Number (xxxx) and Date of Birth (mm/dd/yyyy) as indicated and click Submit. You will be taken to the next sign-up page. 5. Create a milog ID. This will be your milog login ID and cannot be changed, so think of one that is secure and easy to remember. 6. Create a milog password. You can change your password at any time. 7. Enter your Password Reset Question and Answer. This can be used at a later time if you forget your password. 8. Enter your e-mail address. You will receive e-mail notification when new information is available in [address] 9. Click Sign Up. You can now view and download portions of your medical record. 10. Click the Download Summary menu link to download a portable copy of your medical information. If you have questions, please visit the Frequently Asked Questions section of the milog website. Remember, milog is NOT to be used for urgent needs. For medical emergencies, dial 911. Now available from your iPhone and Android! Please provide this summary of care documentation to your next provider. Your primary care clinician is listed as Quang Taylor. If you have any questions after today's visit, please call [phone].

## 2018-05-15 ENCOUNTER — OFFICE VISIT (OUTPATIENT)
Dept: ORTHOPEDIC SURGERY | Age: 68
End: 2018-05-15

## 2018-05-15 VITALS
BODY MASS INDEX: 41.83 KG/M2 | HEIGHT: 64 IN | SYSTOLIC BLOOD PRESSURE: 122 MMHG | WEIGHT: 245 LBS | HEART RATE: 70 BPM | DIASTOLIC BLOOD PRESSURE: 59 MMHG

## 2018-05-15 DIAGNOSIS — M51.16 LUMBAR DISC DISEASE WITH RADICULOPATHY: ICD-10-CM

## 2018-05-15 DIAGNOSIS — M54.5 BILATERAL LOW BACK PAIN, UNSPECIFIED CHRONICITY, WITH SCIATICA PRESENCE UNSPECIFIED: Primary | ICD-10-CM

## 2018-05-15 NOTE — MR AVS SNAPSHOT
[address] [facility] 
 
 
 [address] 200 [facility] 
[phone] Patient: Dottie Kwon MRN: [id number] OM Visit Information Date & Time Provider Department Dept. Phone Encounter #  
 5/15/2018  3:15 PM Yesenia Catalan MD 4 [facility], [address] and [facility] [place] [phone] [id number] Upcoming Health Maintenance Date Due Hepatitis C Screening 1950 DTaP/Tdap/Td series (1 - Tdap) 1971 BREAST CANCER SCRN MAMMOGRAM 2000 FOBT Q 1 YEAR AGE 50-75 2000 ZOSTER VACCINE AGE 60> 2010 GLAUCOMA SCREENING Q2Y 2015 Bone Densitometry (Dexa) Screening 2015 Pneumococcal 65+ Low/Medium Risk (1 of 2 - PCV13) 2015 MEDICARE YEARLY EXAM 3/14/2018 Influenza Age 5 to Adult 2018 Allergies as of 5/15/2018  Review Complete On: 5/15/2018 By: Yesenai Catalan MD  
 No Known Allergies Current Immunizations  Never Reviewed No immunizations on file. Not reviewed this visit You Were Diagnosed With   
  
 Codes Comments Bilateral low back pain, unspecified chronicity, with sciatica presence unspecified    -  Primary ICD-10-CM: M54.5 ICD-9-CM: 724.2 Lumbar disc disease with radiculopathy     ICD-10-CM: M51.16 
ICD-9-CM: 722.10 Vitals BP Pulse Height(growth percentile) Weight(growth percentile) BMI OB Status 122/59 70 5' 4\" (1.626 m) 245 lb (111.1 kg) 42.05 kg/m2 Menopause Smoking Status Never Smoker Vitals History BMI and BSA Data Body Mass Index Body Surface Area 42.05 kg/m 2 2.24 m 2 Preferred Pharmacy Pharmacy Name Phone [id number] Aleks[facility] [address] Your Updated Medication List  
  
   
This list is accurate as of 5/15/18  3:47 PM.  Always use your most recent med list.  
  
  
  
  
 bisoprolol-hydroCHLOROthiazide 10-6.25 mg per tablet Commonly known as:  [facility] Take 1 Tab by mouth daily. citalopram 20 mg tablet Commonly known as:  CELEXA  
TAKE 1 TABLET BY MOUTH ONCE DAILY  
  
 FEMARA 2.5 mg tablet Generic drug:  letrozole Take 2.5 mg by mouth daily. hydroCHLOROthiazide 12.5 mg tablet Commonly known as:  HYDRODIURIL  
TAKE 1 TAB BY MOUTH DAILY AS NEEDED FOR LEG SWELLING  
  
 levothyroxine 175 mcg tablet Commonly known as:  SYNTHROID Take 175 mcg by mouth Daily (before breakfast). meloxicam 15 mg tablet Commonly known as:  MOBIC Take 1 Tab by mouth daily (with breakfast). PROAIR HFA 90 mcg/actuation inhaler Generic drug:  albuterol INHALE 2 PUFFS BY MOUTH EVERY 4-6 HOURS AS NEEDED FOR WHEEZING AND COUGH  
  
 sertraline 100 mg tablet Commonly known as:  ZOLOFT Take 100 mg by mouth daily. VITAMIN D2 50,000 unit capsule Generic drug:  ergocalciferol Take 50,000 Units by mouth every seven (7) days. We Performed the Following AMB POC XRAY, SPINE, LUMBOSACRAL; 2 O [81826 CPT(R)] REFERRAL TO PHYSICAL THERAPY [AEN10 Custom] Comments:  
 Evaluate and treat Referral Information Referral ID Referred By Referred To  
  
 [id number] Raisa Beasley [address] [place] [address] Phone: [phone] Fax: [phone] Visits Status Start Date End Date 1 New Request 5/15/18 5/15/19 If your referral has a status of pending review or denied, additional information will be sent to support the outcome of this decision. Introducing [facility] & HEALTH SERVICES! [employer] Moe introduces "[employer] patient portal. Now you can access parts of your medical record, email your doctor's office, and request medication refills online. 1. In your internet browser, go to [email or web address] 2. Click on the First Time User? Click Here link in the Sign In box. You will see the New Member Sign Up page. 3. Enter your CellEra Access Code exactly as it appears below. You will not need to use this code after youve completed the sign-up process. If you do not sign up before the expiration date, you must request a new code. · [employer] Access Code: [email or web address] Expires: 8/13/2018  3:05 PM 
 
4. Enter the last four digits of your Social Security Number (xxxx) and Date of Birth (mm/dd/yyyy) as indicated and click Submit. You will be taken to the next sign-up page. 5. Create a CellEra ID. This will be your CellEra login ID and cannot be changed, so think of one that is secure and easy to remember. 6. Create a CellEra password. You can change your password at any time. 7. Enter your Password Reset Question and Answer. This can be used at a later time if you forget your password. 8. Enter your e-mail address. You will receive e-mail notification when new information is available in [address] 9. Click Sign Up. You can now view and download portions of your medical record. 10. Click the Download Summary menu link to download a portable copy of your medical information. If you have questions, please visit the Frequently Asked Questions section of the [employer] website. Remember, CellEra is NOT to be used for urgent needs. For medical emergencies, dial 911. Now available from your iPhone and Android! Please provide this summary of care documentation to your next provider. Your primary care clinician is listed as Lura Homans. If you have any questions after today's visit, please call [phone].

## 2018-05-15 NOTE — PROGRESS NOTES
HISTORY OF PRESENT ILLNESS: Misael Baldwin is here for consultation regarding some pain radiating into her right lower extremity. For the past two or three weeks, she has noticed pain towards the right buttock and radiating down the right leg down the posterolateral aspect of the right thigh. It is not necessarily aggravated with weightbearing activities. She does rely on a cane for ambulation assistance. She has known mild osteoarthritis of her knees but this has been well controlled. She was seen by her family physician who started her on some Diclofenac recently, and this has helped a little bit. She has pain sitting, as well as resting, as well as with walking. She denies bowel or bladder dysfunction. PHYSICAL EXAMINATION:  Clinical examination reveals a morbidly obese, 66-year-old female in mild discomfort. She does ambulate with a slight antalgic gait with decreased stance phase on the right leg. She uses a cane for ambulation assistance. With reference to her lower extremities, she has intact motor strength and sensation on both lower extremities proximal and distal. Right and left hip roll tests are negative. Sitting straight leg raise test on the left is negative. Sitting straight leg raise test on the right results in right buttock discomfort. Scotts test is positive on the right side and negative on the left side. RADIOGRAPHS: X-rays of the lumbosacral spine reveal some degenerative disc disease of the lower lumbar spine, L4-5 and L5-S1, but minimal. There is no evidence of spondylolisthesis. IMPRESSION: Lumbar disc disease with radiculopathy by history. RECOMMENDATIONS:  She will continue on the Diclofenac medication. I have added a course of physical therapy for her, and she has been to therapy before and had very good success with this. She will return to see me in a couple of months. If her symptoms become worse in the meantime, she is to notify me.  All of her questions were answered today.                 Vitals:    05/15/18 1516   BP: 122/59   Pulse: 70   Weight: 245 lb (111.1 kg)   Height: 5' 4\" (1.626 m)   PainSc:   7   PainLoc: Buttocks       Patient Active Problem List   Diagnosis Code    Obesity, morbid ([facility] E66.01     Patient Active Problem List    Diagnosis Date Noted    Obesity, morbid ([facility] 12/26/2017     Current Outpatient Prescriptions   Medication Sig Dispense Refill    meloxicam (MOBIC) 15 mg tablet Take 1 Tab by mouth daily (with breakfast). 90 Tab 0    PROAIR HFA 90 mcg/actuation inhaler INHALE 2 PUFFS BY MOUTH EVERY 4-6 HOURS AS NEEDED FOR WHEEZING AND COUGH  0    hydroCHLOROthiazide (HYDRODIURIL) 12.5 mg tablet TAKE 1 TAB BY MOUTH DAILY AS NEEDED FOR LEG SWELLING  3    VITAMIN D2 50,000 unit capsule Take 50,000 Units by mouth every seven (7) days.  bisoprolol-hydrochlorothiazide (ZIAC) 10-6.25 mg per tablet Take 1 Tab by mouth daily.  levothyroxine (SYNTHROID) 175 mcg tablet Take 175 mcg by mouth Daily (before breakfast).  sertraline (ZOLOFT) 100 mg tablet Take 100 mg by mouth daily.  letrozole (FEMARA) 2.5 mg tablet Take 2.5 mg by mouth daily.       citalopram (CELEXA) 20 mg tablet TAKE 1 TABLET BY MOUTH ONCE DAILY  3     No Known Allergies  Past Medical History:   Diagnosis Date    Breast cancer ([facility]      Past Surgical History:   Procedure Laterality Date    HX ANKLE FRACTURE TX      HX GASTRIC BYPASS      HX PARTIAL THYROIDECTOMY      SURGERY OF BREAST CAPSULE       Family History   Problem Relation Age of Onset    No Known Problems Mother     No Known Problems Father      Social History   Substance Use Topics    Smoking status: Never Smoker    Smokeless tobacco: Never Used    Alcohol use 0.0 oz/week     0 Standard drinks or equivalent per week

## 2018-05-16 ENCOUNTER — TELEPHONE (OUTPATIENT)
Dept: ORTHOPEDIC SURGERY | Age: 68
End: 2018-05-16

## 2018-05-16 NOTE — TELEPHONE ENCOUNTER
Patient called in states that she would like to do PT at the [facility] over by the [facility] not in motion. Please contact patient at [phone].

## 2018-05-17 NOTE — TELEPHONE ENCOUNTER
Patient called the [facility] and they have not recd any order for physical therapy for her yet. Please sent over asa so she can start her therapy as ordered by Dr. Callie Estrada.   Please call juan [phone]

## 2018-06-12 ENCOUNTER — OFFICE VISIT (OUTPATIENT)
Dept: ORTHOPEDIC SURGERY | Age: 68
End: 2018-06-12

## 2018-06-12 VITALS
WEIGHT: 246 LBS | HEART RATE: 52 BPM | HEIGHT: 64 IN | DIASTOLIC BLOOD PRESSURE: 65 MMHG | SYSTOLIC BLOOD PRESSURE: 140 MMHG | BODY MASS INDEX: 42 KG/M2

## 2018-06-12 DIAGNOSIS — S80.02XA CONTUSION OF LEFT KNEE AND LOWER LEG, INITIAL ENCOUNTER: Primary | ICD-10-CM

## 2018-06-12 DIAGNOSIS — S80.12XA CONTUSION OF LEFT KNEE AND LOWER LEG, INITIAL ENCOUNTER: Primary | ICD-10-CM

## 2018-06-12 RX ORDER — DICLOFENAC SODIUM 75 MG/1
TABLET, DELAYED RELEASE ORAL
COMMUNITY
End: 2018-07-03 | Stop reason: ALTCHOICE

## 2018-06-12 NOTE — PROGRESS NOTES
HISTORY OF PRESENT ILLNESS:  Janelle Bradley is a 40-year-old female who is a well-known patient to my practice. She was helping her  through a doctors appointment and he has Parkinsons disease. He started to fall in the parking lot and he grabbed hold of her and she went down hard on her left knee. This occurred about two weeks ago. She was able to get up and fully weightbear on the left leg after the injury. She is on a walker. She did notice significant bruising in the left knee over the next week. She had recent x-rays of the left knee which were reported as negative. There is no other associated musculoskeletal injuries. There is no history of loss of consciousness. PHYSICAL EXAMINATION:  Clinical evaluation today reveals a significantly overweight 40-year-old female in minimal discomfort. She is fully weightbearing on a walker with the left leg. She rubs her left knee. She has some residual ecchymosis over the anterior as well as the posterior aspect of the left knee. She has full active extension of the left knee and no pain or weakness with resisted left knee extension. Quadriceps and patellar tendons are intact. She has crepitus of the patellofemoral of her left knee with flexion and extension. She has slight palpable lateral and medial joint line tenderness but this is where she fell. She had a moderate effusion of her left knee. She has good collateral as well as cruciate ligaments of the left knee. Anterior and posterior drawer tests are negative. Lachmans test is a little difficult to assess today due her bruising. She has no left calf tenderness and swelling could not be assessed due to her obesity, neurovascularly intact to the left lower extremity proximally and distally motor strength and sensation. She has good collateral ligaments of the left knee with no opening to varus and valgus stress test and full extension and 30? of flexion.         RADIOGRAPHS:  X-rays of her left knee were reviewed and reveal no acute osseous pathology. She has adequate joint space in the weightbearing portion of her left knee. She does have some arthritis involving the patellofemoral joint. There is no patellar fracture. IMPRESSION:  Left knee contusion. RECOMMENDATIONS:   Treatment will remain symptomatic and conservative. She has full active extension of her left knee and does not need therapy at this point; however, she has been in therapy for sciatica. Therapy is helping but she stopped this about two weeks ago when she fell. At this point, I think that therapy runs out around July 4th and I would like to get her back into therapy for the sciatica maybe in a couple of weeks. She is to resume therapy thenbut I do not think that she needs therapy for her left knee at this point. She will return to see me for repeat examination in about three weeks. All of her questions were answered today. Vitals:    06/12/18 1548   BP: 140/65   Pulse: (!) 52   Weight: 246 lb (111.6 kg)   Height: 5' 4\" (1.626 m)   PainSc:   0 - No pain   PainLoc: Knee       Patient Active Problem List   Diagnosis Code    Obesity, morbid ([facility]) E66.01     Patient Active Problem List    Diagnosis Date Noted    Obesity, morbid ([facility] 12/26/2017     Current Outpatient Prescriptions   Medication Sig Dispense Refill    diclofenac EC (VOLTAREN) 75 mg EC tablet Take  by mouth.  PROAIR HFA 90 mcg/actuation inhaler INHALE 2 PUFFS BY MOUTH EVERY 4-6 HOURS AS NEEDED FOR WHEEZING AND COUGH  0    hydroCHLOROthiazide (HYDRODIURIL) 12.5 mg tablet TAKE 1 TAB BY MOUTH DAILY AS NEEDED FOR LEG SWELLING  3    VITAMIN D2 50,000 unit capsule Take 50,000 Units by mouth every seven (7) days.  bisoprolol-hydrochlorothiazide (ZIAC) 10-6.25 mg per tablet Take 1 Tab by mouth daily.  levothyroxine (SYNTHROID) 175 mcg tablet Take 175 mcg by mouth Daily (before breakfast).       sertraline (ZOLOFT) 100 mg tablet Take 100 mg by mouth daily.  letrozole (FEMARA) 2.5 mg tablet Take 2.5 mg by mouth daily.  meloxicam (MOBIC) 15 mg tablet Take 1 Tab by mouth daily (with breakfast).  (Patient not taking: Reported on 6/12/2018) 90 Tab 0    citalopram (CELEXA) 20 mg tablet TAKE 1 TABLET BY MOUTH ONCE DAILY  3     No Known Allergies  Past Medical History:   Diagnosis Date    Breast cancer ([facility]      Past Surgical History:   Procedure Laterality Date    HX ANKLE FRACTURE TX      HX GASTRIC BYPASS      HX PARTIAL THYROIDECTOMY      SURGERY OF BREAST CAPSULE       Family History   Problem Relation Age of Onset    No Known Problems Mother     No Known Problems Father      Social History   Substance Use Topics    Smoking status: Never Smoker    Smokeless tobacco: Never Used    Alcohol use 0.0 oz/week     0 Standard drinks or equivalent per week

## 2018-07-03 ENCOUNTER — OFFICE VISIT (OUTPATIENT)
Dept: ORTHOPEDIC SURGERY | Age: 68
End: 2018-07-03

## 2018-07-03 VITALS
HEIGHT: 64 IN | TEMPERATURE: 97.6 F | SYSTOLIC BLOOD PRESSURE: 113 MMHG | DIASTOLIC BLOOD PRESSURE: 59 MMHG | WEIGHT: 243.6 LBS | HEART RATE: 69 BPM | OXYGEN SATURATION: 93 % | BODY MASS INDEX: 41.59 KG/M2

## 2018-07-03 DIAGNOSIS — S80.12XD CONTUSION OF LEFT KNEE AND LOWER LEG, SUBSEQUENT ENCOUNTER: ICD-10-CM

## 2018-07-03 DIAGNOSIS — S80.02XD CONTUSION OF LEFT KNEE AND LOWER LEG, SUBSEQUENT ENCOUNTER: ICD-10-CM

## 2018-07-03 DIAGNOSIS — M51.16 LUMBAR DISC DISEASE WITH RADICULOPATHY: Primary | ICD-10-CM

## 2018-07-03 RX ORDER — NABUMETONE 500 MG/1
500 TABLET, FILM COATED ORAL DAILY
Qty: 60 TAB | Refills: 1 | Status: SHIPPED | OUTPATIENT
Start: 2018-07-03

## 2018-07-03 NOTE — MR AVS SNAPSHOT
[facility] 
 
 
 [address] 200 [facility] 
[phone] Patient: Ashley Hebert MRN: [id number] BST:5/55/1692 Visit Information Date & Time Provider Department Dept. Phone Encounter #  
 7/3/2018  8:00 AM Jessica Barraza MD [address] [place] [address], [address] and [facility] [place] [phone] [id number] Follow-up Instructions Return in about 5 weeks (around 8/7/2018). Follow-up and Disposition History Upcoming Health Maintenance Date Due Hepatitis C Screening 1950 DTaP/Tdap/Td series (1 - Tdap) 8/11/1971 BREAST CANCER SCRN MAMMOGRAM 8/11/2000 FOBT Q 1 YEAR AGE 50-75 8/11/2000 ZOSTER VACCINE AGE 60> 6/11/2010 GLAUCOMA SCREENING Q2Y 8/11/2015 Bone Densitometry (Dexa) Screening 8/11/2015 Pneumococcal 65+ Low/Medium Risk (1 of 2 - PCV13) 8/11/2015 MEDICARE YEARLY EXAM 3/14/2018 Influenza Age 5 to Adult 8/1/2018 Allergies as of 7/3/2018  Review Complete On: 7/3/2018 By: Jessica Barraza MD  
 No Known Allergies Current Immunizations  Never Reviewed No immunizations on file. Not reviewed this visit You Were Diagnosed With   
  
 Codes Comments Lumbar disc disease with radiculopathy    -  Primary ICD-10-CM: M51.16 
ICD-9-CM: 722.10 Contusion of left knee and lower leg, subsequent encounter     ICD-10-CM: S80.02XD, R35.20DZ ICD-9-CM: V58.89, 924.10 Vitals BP Pulse Temp Height(growth percentile) Weight(growth percentile) SpO2  
 113/59 69 97.6 °F (36.4 °C) (Oral) 5' 4\" (1.626 m) 243 lb 9.6 oz (110.5 kg) 93% BMI OB Status Smoking Status 41.81 kg/m2 Menopause Never Smoker BMI and BSA Data Body Mass Index Body Surface Area  
 41.81 kg/m 2 2.23 m 2 Preferred Pharmacy Pharmacy Name Phone [id number] Aleksandra [address] Your Updated Medication List  
  
   
 This list is accurate as of 7/3/18  8:40 AM.  Always use your most recent med list.  
  
  
  
  
 bisoprolol-hydroCHLOROthiazide 10-6.25 mg per tablet Commonly known as:  [facility] Take 1 Tab by mouth daily. citalopram 20 mg tablet Commonly known as:  CELEXA  
TAKE 1 TABLET BY MOUTH ONCE DAILY  
  
 FEMARA 2.5 mg tablet Generic drug:  letrozole Take 2.5 mg by mouth daily. hydroCHLOROthiazide 12.5 mg tablet Commonly known as:  HYDRODIURIL  
TAKE 1 TAB BY MOUTH DAILY AS NEEDED FOR LEG SWELLING  
  
 levothyroxine 175 mcg tablet Commonly known as:  SYNTHROID Take 175 mcg by mouth Daily (before breakfast). nabumetone 500 mg tablet Commonly known as:  RELAFEN Take 1 Tab by mouth daily. PROAIR HFA 90 mcg/actuation inhaler Generic drug:  albuterol INHALE 2 PUFFS BY MOUTH EVERY 4-6 HOURS AS NEEDED FOR WHEEZING AND COUGH  
  
 sertraline 100 mg tablet Commonly known as:  ZOLOFT Take 100 mg by mouth daily. VITAMIN D2 50,000 unit capsule Generic drug:  ergocalciferol Take 50,000 Units by mouth every seven (7) days. Prescriptions Sent to Pharmacy Refills  
 nabumetone (RELAFEN) 500 mg tablet 1 Sig: Take 1 Tab by mouth daily. Class: Normal  
 Pharmacy: [facility]/pharmacy [address] [place] [address] #: [phone] Route: Oral  
  
Follow-up Instructions Return in about 5 weeks (around 8/7/2018). Introducing [facility] & HEALTH SERVICES! [employer] introduces "[employer] patient portal. Now you can access parts of your medical record, email your doctor's office, and request medication refills online. 1. In your internet browser, go to [email or web address] 2. Click on the First Time User? Click Here link in the Sign In box. You will see the New Member Sign Up page. 3. Enter your "[employer] Access Code exactly as it appears below. You will not need to use this code after youve completed the sign-up process.  If you do not sign up before the expiration date, you must request a new code. · Polleverywhere Access Code: [email or web address] Expires: 8/13/2018  3:05 PM 
 
4. Enter the last four digits of your Social Security Number (xxxx) and Date of Birth (mm/dd/yyyy) as indicated and click Submit. You will be taken to the next sign-up page. 5. Create a Polleverywhere ID. This will be your Polleverywhere login ID and cannot be changed, so think of one that is secure and easy to remember. 6. Create a Polleverywhere password. You can change your password at any time. 7. Enter your Password Reset Question and Answer. This can be used at a later time if you forget your password. 8. Enter your e-mail address. You will receive e-mail notification when new information is available in [address] 9. Click Sign Up. You can now view and download portions of your medical record. 10. Click the Download Summary menu link to download a portable copy of your medical information. If you have questions, please visit the Frequently Asked Questions section of the [employer]levery[employer] website. Remember, [employer]le[employer]where is NOT to be used for urgent needs. For medical emergencies, dial 911. Now available from your iPhone and Android! Please provide this summary of care documentation to your next provider. Your primary care clinician is listed as Tomasz Sánchez. If you have any questions after today's visit, please call [phone].

## 2018-07-03 NOTE — PROGRESS NOTES
HISTORY OF PRESENT ILLNESS:  Lula Fletcher is here for follow up with reference to her left knee. She fell on her left knee when trying to help her , who has Parkinsons disease, in a parking lot. This occurred at the end of May/first of June of this year. She states she noticed some achiness in her knee but not severe. She does rely on a cane for ambulation assistance but was doing this even prior to her injury. She has known, mild arthritis in her left knee but not severe. There is no acute osseous pathology. She states her knee is a little bit better. She still notes just some generalized achiness to her left knee. PHYSICAL EXAMINATION:   Clinical examination reveals no significant soft tissue swelling of her left knee. She has fair functional range of motion of her left knee from full extension to flexion of about 105°. Flexion beyond this is limited more secondary to her obesity. She has slight palpable lateral joint line tenderness and does have crepitance to the patellofemoral area of her left knee with flexion and extension. She has no palpable medial joint line tenderness. Jazmine's test is negative. Lachman's test is negative. Anterior and posterior drawer tests are negative. She has good collateral, as well as cruciate ligament stability of the left knee. She has no left calf tenderness. Swelling is a little difficult to assess due to her obesity. Neurovascular testing is intact to the left lower extremity. She has good passive range of motion of her left hip without discomfort. Sitting straight leg raise test is negative. IMPRESSION:  Status post contusion, left knee. RECOMMENDATIONS:  At this point, I will place her on an anti-inflammatory, Relafen 500 mg po bid. She had Voltaren and Meloxicam listed but has not been taking the Meloxicam.  This Relafen will take the place of the Voltaren medication. I will check her back in about four weeks.   At that point, she may be a candidate for a little physical therapy to work on strengthening and balance exercises. She is afraid she is going to fall. All her questions were answered to her satisfaction. Vitals:    07/03/18 0823   BP: 113/59   Pulse: 69   Temp: 97.6 °F (36.4 °C)   TempSrc: Oral   SpO2: 93%   Weight: 243 lb 9.6 oz (110.5 kg)   Height: 5' 4\" (1.626 m)   PainSc:   4   PainLoc: Knee       Patient Active Problem List   Diagnosis Code    Obesity, morbid ([facility]) E66.01     Patient Active Problem List    Diagnosis Date Noted    [facility], [facility] 12/26/2017     Current Outpatient Prescriptions   Medication Sig Dispense Refill    PROAIR HFA 90 mcg/actuation inhaler INHALE 2 PUFFS BY MOUTH EVERY 4-6 HOURS AS NEEDED FOR WHEEZING AND COUGH  0    hydroCHLOROthiazide (HYDRODIURIL) 12.5 mg tablet TAKE 1 TAB BY MOUTH DAILY AS NEEDED FOR LEG SWELLING  3    VITAMIN D2 50,000 unit capsule Take 50,000 Units by mouth every seven (7) days.  bisoprolol-hydrochlorothiazide (ZIAC) 10-6.25 mg per tablet Take 1 Tab by mouth daily.  levothyroxine (SYNTHROID) 175 mcg tablet Take 175 mcg by mouth Daily (before breakfast).  sertraline (ZOLOFT) 100 mg tablet Take 100 mg by mouth daily.  letrozole (FEMARA) 2.5 mg tablet Take 2.5 mg by mouth daily.  diclofenac EC (VOLTAREN) 75 mg EC tablet Take  by mouth.  meloxicam (MOBIC) 15 mg tablet Take 1 Tab by mouth daily (with breakfast).  (Patient not taking: Reported on 6/12/2018) 90 Tab 0    citalopram (CELEXA) 20 mg tablet TAKE 1 TABLET BY MOUTH ONCE DAILY  3     No Known Allergies  Past Medical History:   Diagnosis Date    Breast cancer ([facility]      Past Surgical History:   Procedure Laterality Date    HX ANKLE FRACTURE TX      HX GASTRIC BYPASS      HX PARTIAL THYROIDECTOMY      SURGERY OF BREAST CAPSULE       Family History   Problem Relation Age of Onset    No Known Problems Mother     No Known Problems Father      Social History Substance Use Topics    Smoking status: Never Smoker    Smokeless tobacco: Never Used    Alcohol use 0.0 oz/week     0 Standard drinks or equivalent per week

## 2018-08-07 ENCOUNTER — OFFICE VISIT (OUTPATIENT)
Dept: ORTHOPEDIC SURGERY | Age: 68
End: 2018-08-07

## 2018-08-07 VITALS
DIASTOLIC BLOOD PRESSURE: 64 MMHG | BODY MASS INDEX: 41.48 KG/M2 | HEART RATE: 59 BPM | SYSTOLIC BLOOD PRESSURE: 118 MMHG | HEIGHT: 64 IN | WEIGHT: 243 LBS | OXYGEN SATURATION: 92 %

## 2018-08-07 DIAGNOSIS — M62.81 QUADRICEPS WEAKNESS: Primary | ICD-10-CM

## 2018-08-07 DIAGNOSIS — S80.11XD CONTUSION OF RIGHT KNEE AND LOWER LEG, SUBSEQUENT ENCOUNTER: ICD-10-CM

## 2018-08-07 DIAGNOSIS — S80.01XD CONTUSION OF RIGHT KNEE AND LOWER LEG, SUBSEQUENT ENCOUNTER: ICD-10-CM

## 2018-08-07 NOTE — PROGRESS NOTES
HISTORY OF PRESENT ILLNESS:  Mario Short is here for followup with reference to her right knee. I did place her on some Lodine medication, which she does state helped the pain and some of the swelling in her right knee. She is a little bit better. She has her hands full with her , Jenn Hensley, who has Parkinsons disease, and we just saw him last week. He is very unstable. She also is afraid of falling. She has also noted some slight swelling, not of the knee but of the right leg. She does use a cane intermittently for ambulation assistance. PHYSICAL EXAMINATION:   With reference to her right knee, she has no significant effusion of the right knee. She has a fairly good range of motion of the right knee from full extension to flexion of about 120°. She has no palpable medial or lateral joint line tenderness. She has good collateral, as well as cruciate ligament stability of the right knee. Lachman's test is negative. Jazmine's test is negative. Anterior and posterior drawer tests are negative. She does have mild right calf swelling but no tenderness and has a little bit of erythema to the right leg up to the mid tibia. This is a little bit different than her left leg, and she has had this before when I saw her. IMPRESSION:      1. Status post contusion, right knee. 2. Swelling, right leg. RECOMMENDATIONS:  She is supposed to see her family doctor in the near future, and I told her to see him in the next week or two just to make sure this does not get worse in the right leg. This is somewhat of a chronic situation for her. In the meantime, I have started her on some physical therapy for lower extremity strengthening and fall prevention. All her questions were answered today.                     Vitals:    08/07/18 1305   BP: 118/64   Pulse: (!) 59   SpO2: 92%   Weight: 243 lb (110.2 kg)   Height: 5' 4\" (1.626 m)       Patient Active Problem List   Diagnosis Code    Obesity, morbid ([place] [address] E66.01     Patient Active Problem List    Diagnosis Date Noted    Obesity, morbid ([place] [address] 12/26/2017     Current Outpatient Prescriptions   Medication Sig Dispense Refill    nabumetone (RELAFEN) 500 mg tablet Take 1 Tab by mouth daily. 60 Tab 1    PROAIR HFA 90 mcg/actuation inhaler INHALE 2 PUFFS BY MOUTH EVERY 4-6 HOURS AS NEEDED FOR WHEEZING AND COUGH  0    citalopram (CELEXA) 20 mg tablet TAKE 1 TABLET BY MOUTH ONCE DAILY  3    hydroCHLOROthiazide (HYDRODIURIL) 12.5 mg tablet TAKE 1 TAB BY MOUTH DAILY AS NEEDED FOR LEG SWELLING  3    VITAMIN D2 50,000 unit capsule Take 50,000 Units by mouth every seven (7) days.  bisoprolol-hydrochlorothiazide (ZIAC) 10-6.25 mg per tablet Take 1 Tab by mouth daily.  levothyroxine (SYNTHROID) 175 mcg tablet Take 175 mcg by mouth Daily (before breakfast).  sertraline (ZOLOFT) 100 mg tablet Take 100 mg by mouth daily.  letrozole (FEMARA) 2.5 mg tablet Take 2.5 mg by mouth daily.        No Known Allergies  Past Medical History:   Diagnosis Date    Breast cancer ([place] [address]      Past Surgical History:   Procedure Laterality Date    HX ANKLE FRACTURE TX      HX GASTRIC BYPASS      HX PARTIAL THYROIDECTOMY      SURGERY OF BREAST CAPSULE       Family History   Problem Relation Age of Onset    No Known Problems Mother     No Known Problems Father      Social History   Substance Use Topics    Smoking status: Never Smoker    Smokeless tobacco: Never Used    Alcohol use 0.0 oz/week     0 Standard drinks or equivalent per week

## 2018-10-09 ENCOUNTER — OFFICE VISIT (OUTPATIENT)
Dept: ORTHOPEDIC SURGERY | Age: 68
End: 2018-10-09

## 2018-10-09 VITALS
HEART RATE: 79 BPM | DIASTOLIC BLOOD PRESSURE: 70 MMHG | OXYGEN SATURATION: 97 % | WEIGHT: 256 LBS | HEIGHT: 64 IN | SYSTOLIC BLOOD PRESSURE: 128 MMHG | BODY MASS INDEX: 43.71 KG/M2

## 2018-10-09 DIAGNOSIS — M25.561 RIGHT KNEE PAIN, UNSPECIFIED CHRONICITY: Primary | ICD-10-CM

## 2018-10-09 DIAGNOSIS — M25.461 EFFUSION OF KNEE JOINT RIGHT: ICD-10-CM

## 2018-10-09 DIAGNOSIS — M17.0 ARTHRITIS OF BOTH KNEES: ICD-10-CM

## 2018-10-09 RX ORDER — TRIAMCINOLONE ACETONIDE 40 MG/ML
40 INJECTION, SUSPENSION INTRA-ARTICULAR; INTRAMUSCULAR ONCE
Qty: 1 ML | Refills: 0 | Status: CANCELLED
Start: 2018-10-09 | End: 2018-10-09

## 2018-10-09 RX ORDER — TRIAMCINOLONE ACETONIDE 40 MG/ML
40 INJECTION, SUSPENSION INTRA-ARTICULAR; INTRAMUSCULAR ONCE
Qty: 1 ML | Refills: 0
Start: 2018-10-09 | End: 2018-10-09

## 2018-10-09 NOTE — PROGRESS NOTES
HISTORY OF PRESENT ILLNESS:  Kieran Colon is here for followup with reference to both knees. She has known osteoarthritis of both knees but does not want surgery at this point. Her , Tyler Gomez, has significant Parkinsons disease, and she does take care of him. He has been in the office a couple of times, and he does have significant tremors and balance issues. They have told her she really needs to have someone there 24/7, but insurance does not cover this. She has someone there from 9 to 3 to watch him, but other than that, she has to take care of him. At this point, she states her right knee bothers her more than the left. She has noticed some swelling of the right knee. PHYSICAL EXAMINATION:   Clinical examination reveals a significantly overweight female. She has difficulty getting in and out of a chair. She has put on weight since we last saw her. Her last weight was 243. She is not 256 with a BMI of almost 44. Clinical examination of the right knee reveals she has a moderate effusion of the right knee. She has almost full extension of the right knee and flexion to about 90°. Flexion beyond this is limited secondary to her obesity and swelling. She also has some pitting edema to the right lower extremity, but she is not taking her fluid pill today. She has slight palpable medial joint line tenderness. She has no palpable lateral joint line tenderness. She has good collateral, as well as cruciate ligament stability of the right knee. Anterior and posterior drawer tests are negative. Neurovascular testing is intact to the right lower extremity. IMPRESSION:      1. Osteoarthritis both knees. 2. Effusion right knee. RECOMMENDATIONS:  Under sterile technique, following her permission, I aspirated the right knee of clear joint fluid and then injected the knee with Kenalog. She tolerated the procedure well. She is to take her fluid pill when she gets home today.   She also needs to see her family physician regarding the pitting edema of her lower extremities. The importance of weight reduction was again stressed with the patient. All her questions were answered today. Vitals:    10/09/18 1403   BP: 128/70   Pulse: 79   SpO2: 97%   Weight: 256 lb (116.1 kg)   Height: 5' 4\" (1.626 m)   PainSc:   6   PainLoc: Knee       Patient Active Problem List   Diagnosis Code    Obesity, morbid ([facility] E66.01     Patient Active Problem List    Diagnosis Date Noted    Obesity, morbid ([facility] 12/26/2017     Current Outpatient Prescriptions   Medication Sig Dispense Refill    nabumetone (RELAFEN) 500 mg tablet Take 1 Tab by mouth daily. 60 Tab 1    PROAIR HFA 90 mcg/actuation inhaler INHALE 2 PUFFS BY MOUTH EVERY 4-6 HOURS AS NEEDED FOR WHEEZING AND COUGH  0    hydroCHLOROthiazide (HYDRODIURIL) 12.5 mg tablet TAKE 1 TAB BY MOUTH DAILY AS NEEDED FOR LEG SWELLING  3    VITAMIN D2 50,000 unit capsule Take 50,000 Units by mouth every seven (7) days.  bisoprolol-hydrochlorothiazide (ZIAC) 10-6.25 mg per tablet Take 1 Tab by mouth daily.  levothyroxine (SYNTHROID) 175 mcg tablet Take 175 mcg by mouth Daily (before breakfast).  sertraline (ZOLOFT) 100 mg tablet Take 100 mg by mouth daily.  letrozole (FEMARA) 2.5 mg tablet Take 2.5 mg by mouth daily.       citalopram (CELEXA) 20 mg tablet TAKE 1 TABLET BY MOUTH ONCE DAILY  3     No Known Allergies  Past Medical History:   Diagnosis Date    Breast cancer ([facility]      Past Surgical History:   Procedure Laterality Date    HX ANKLE FRACTURE TX      HX GASTRIC BYPASS      HX PARTIAL THYROIDECTOMY      SURGERY OF BREAST CAPSULE       Family History   Problem Relation Age of Onset    No Known Problems Mother     No Known Problems Father      Social History   Substance Use Topics    Smoking status: Never Smoker    Smokeless tobacco: Never Used    Alcohol use 0.0 oz/week     0 Standard drinks or equivalent per week

## 2018-11-27 ENCOUNTER — OFFICE VISIT (OUTPATIENT)
Dept: ORTHOPEDIC SURGERY | Age: 68
End: 2018-11-27

## 2018-11-27 VITALS
DIASTOLIC BLOOD PRESSURE: 58 MMHG | BODY MASS INDEX: 40.49 KG/M2 | OXYGEN SATURATION: 98 % | TEMPERATURE: 98.2 F | HEIGHT: 64 IN | SYSTOLIC BLOOD PRESSURE: 121 MMHG | HEART RATE: 78 BPM | RESPIRATION RATE: 16 BRPM | WEIGHT: 237.2 LBS

## 2018-11-27 DIAGNOSIS — M62.81 QUADRICEPS WEAKNESS: ICD-10-CM

## 2018-11-27 DIAGNOSIS — M17.0 ARTHRITIS OF BOTH KNEES: Primary | ICD-10-CM

## 2018-11-27 NOTE — PROGRESS NOTES
HISTORY OF PRESENT ILLNESS:  Lizzie Edward is here for followup with reference to her right knee. She has known osteoarthritis of the right knee. She does not want surgery on her right knee. I injected her knee with cortisone October 10, 2018, and she is doing much better with this cortisone shot. She does not have a lot of pain at this point. She does complain of some radiating pain in the right leg, and she has some known arthritis of the lumbar spine. She is actually doing fairly well at this point. She is helping take care of her  who has severe Parkinsons disease. She uses a cane for ambulation assistance. PHYSICAL EXAMINATION:   Clinical examination today reveals a morbidly obese, 78-year-old female. She has actually lost some weight since we last saw her, about 12 pounds. She has not necessarily been on a diet. With reference to her right knee, she has no palpable medial joint line tenderness and no palpable lateral joint line tenderness. She has a fair range of motion of the right knee from full extension to flexion of about 105°. This is not painful to the patient today. She has a minimal effusion of the right knee. She does have some significant vascular changes to both lower extremities. She has much less swelling of her legs today but has some chronic vascular change and actually, she hit her anterior tibia on the bathroom doorframe recently, and it has a little bruise in this area and a little bit of skin breakdown but not obvious infection at this point. She is seeing her family doctor in a few weeks, and she needs to keep this appointment to keep an eye on this leg. With reference to her right knee, she has good collateral, as well as cruciate ligament stability of the right knee. She has mild crepitance to the patellofemoral area of the right knee with flexion and extension. Lachman's test is negative.   Anterior and posterior drawer tests are negative. Jazmine's test is negative. Neurovascular testing is intact to the right lower extremity proximal and distal to motor strength and sensation. IMPRESSION:  Osteoarthritis right knee. RECOMMENDATIONS:  At this point, the cortisone shot has helped her. We are going to wait a little longer and see how long it helps her. If it continues to help her for at least three or four months, then that shot can be repeated. If not, otherwise, I would recommend proceeding with gel injections to her right knee. I have discussed with her again to continue on a weight loss program if possible, although she does not really know why she lost the weight at this point, but she needs to continue losing weight. She will see her family physician in a few weeks, or if the leg gets worse, she is to notify me before that. Ultimately, the patient will be a candidate for knee arthroplasty surgery when conservative treatment does not help her, but she has no interest in doing surgery at this point. All her questions were answered today. ADDENDUM:  She has also been in physical therapy for a while, but she has not continued on a home exercise program.  She has a list of exercises she has to do at home, but she has not been doing them routinely. She does not know where that list is right now, but I have suggested she find it again and do exercise on a daily basis. Vitals:  
 11/27/18 3711 BP: 121/58 Pulse: 78 Resp: 16 Temp: 98.2 °F (36.8 °C) TempSrc: Oral  
SpO2: 98% Weight: 237 lb 3.2 oz (107.6 kg) Height: 5' 4\" (1.626 m) PainSc:   5 PainLoc: Knee Patient Active Problem List  
Diagnosis Code  Obesity, morbid ([facility] E66.01 Patient Active Problem List  
 Diagnosis Date Noted  Obesity, morbid ([facility] 12/26/2017 Current Outpatient Medications Medication Sig Dispense Refill  nabumetone (RELAFEN) 500 mg tablet Take 1 Tab by mouth daily.  60 Tab 1  
  PROAIR HFA 90 mcg/actuation inhaler INHALE 2 PUFFS BY MOUTH EVERY 4-6 HOURS AS NEEDED FOR WHEEZING AND COUGH  0  
 citalopram (CELEXA) 20 mg tablet TAKE 1 TABLET BY MOUTH ONCE DAILY  3  
 hydroCHLOROthiazide (HYDRODIURIL) 12.5 mg tablet TAKE 1 TAB BY MOUTH DAILY AS NEEDED FOR LEG SWELLING  3  
 VITAMIN D2 50,000 unit capsule Take 50,000 Units by mouth every seven (7) days.  bisoprolol-hydrochlorothiazide (ZIAC) 10-6.25 mg per tablet Take 1 Tab by mouth daily.  levothyroxine (SYNTHROID) 175 mcg tablet Take 175 mcg by mouth Daily (before breakfast).  sertraline (ZOLOFT) 100 mg tablet Take 100 mg by mouth daily.  letrozole (FEMARA) 2.5 mg tablet Take 2.5 mg by mouth daily. No Known Allergies Past Medical History:  
Diagnosis Date  Breast cancer ([facility] Past Surgical History:  
Procedure Laterality Date  HX ANKLE FRACTURE TX    
 HX GASTRIC BYPASS  HX PARTIAL THYROIDECTOMY  SURGERY OF BREAST CAPSULE Family History Problem Relation Age of Onset  No Known Problems Mother  No Known Problems Father Social History Tobacco Use  Smoking status: Never Smoker  Smokeless tobacco: Never Used Substance Use Topics  Alcohol use: Yes Alcohol/week: 0.0 oz Vitals:  
 11/27/18 3771 BP: 121/58 Pulse: 78 Resp: 16 Temp: 98.2 °F (36.8 °C) TempSrc: Oral  
SpO2: 98% Weight: 237 lb 3.2 oz (107.6 kg) Height: 5' 4\" (1.626 m) PainSc:   5 PainLoc: Knee Patient Active Problem List  
Diagnosis Code  Obesity, morbid ([facility] E66.01 Patient Active Problem List  
 Diagnosis Date Noted  Obesity, morbid ([facility] 12/26/2017 Current Outpatient Medications Medication Sig Dispense Refill  nabumetone (RELAFEN) 500 mg tablet Take 1 Tab by mouth daily.  60 Tab 1  
 PROAIR HFA 90 mcg/actuation inhaler INHALE 2 PUFFS BY MOUTH EVERY 4-6 HOURS AS NEEDED FOR WHEEZING AND COUGH  0  
  citalopram (CELEXA) 20 mg tablet TAKE 1 TABLET BY MOUTH ONCE DAILY  3  
 hydroCHLOROthiazide (HYDRODIURIL) 12.5 mg tablet TAKE 1 TAB BY MOUTH DAILY AS NEEDED FOR LEG SWELLING  3  
 VITAMIN D2 50,000 unit capsule Take 50,000 Units by mouth every seven (7) days.  bisoprolol-hydrochlorothiazide (ZIAC) 10-6.25 mg per tablet Take 1 Tab by mouth daily.  levothyroxine (SYNTHROID) 175 mcg tablet Take 175 mcg by mouth Daily (before breakfast).  sertraline (ZOLOFT) 100 mg tablet Take 100 mg by mouth daily.  letrozole (FEMARA) 2.5 mg tablet Take 2.5 mg by mouth daily. No Known Allergies Past Medical History:  
Diagnosis Date  Breast cancer ([facility] Past Surgical History:  
Procedure Laterality Date  HX ANKLE FRACTURE TX    
 HX GASTRIC BYPASS  HX PARTIAL THYROIDECTOMY  SURGERY OF BREAST CAPSULE Family History Problem Relation Age of Onset  No Known Problems Mother  No Known Problems Father Social History Tobacco Use  Smoking status: Never Smoker  Smokeless tobacco: Never Used Substance Use Topics  Alcohol use: Yes   Alcohol/week: 0.0 oz

## 2019-04-03 ENCOUNTER — OFFICE VISIT (OUTPATIENT)
Dept: ORTHOPEDIC SURGERY | Age: 69
End: 2019-04-03

## 2019-04-03 VITALS
HEART RATE: 61 BPM | HEIGHT: 64 IN | OXYGEN SATURATION: 94 % | BODY MASS INDEX: 39.44 KG/M2 | WEIGHT: 231 LBS | RESPIRATION RATE: 16 BRPM | SYSTOLIC BLOOD PRESSURE: 133 MMHG | TEMPERATURE: 97.5 F | DIASTOLIC BLOOD PRESSURE: 82 MMHG

## 2019-04-03 DIAGNOSIS — M17.0 BILATERAL PRIMARY OSTEOARTHRITIS OF KNEE: ICD-10-CM

## 2019-04-03 DIAGNOSIS — M25.561 PAIN IN BOTH KNEES, UNSPECIFIED CHRONICITY: Primary | ICD-10-CM

## 2019-04-03 DIAGNOSIS — M25.562 PAIN IN BOTH KNEES, UNSPECIFIED CHRONICITY: Primary | ICD-10-CM

## 2019-04-03 RX ORDER — TRIAMCINOLONE ACETONIDE 40 MG/ML
40 INJECTION, SUSPENSION INTRA-ARTICULAR; INTRAMUSCULAR ONCE
Qty: 1 ML | Refills: 0
Start: 2019-04-03 | End: 2019-04-03

## 2019-04-03 NOTE — PROGRESS NOTES
Patient: Jose Whitfield                MRN: [id number]       SSN: [id number] YOB: 1950        AGE: 76 y.o. SEX: female Body mass index is 39.65 kg/m². PCP: Da Quintana MD 
04/03/19 Chief Complaint: Bilateral knee pain HISTORY OF PRESENT ILLNESS:   Shahram Alvares is a 76year-old female who comes to the office today with bilateral knee pain. She has had right knee injected by Dr. Kee Barnes over a year ago. The left knee is having similar symptoms. She has pain with ambulation. The right knee pain has returned as well. Pain with range of motion. She does ambulate with a walker. Past Medical History:  
Diagnosis Date  Breast cancer ([facility] Family History Problem Relation Age of Onset  No Known Problems Mother  No Known Problems Father Current Outpatient Medications Medication Sig Dispense Refill  FOLIC ACID PO Take  by mouth.  triamcinolone acetonide (KENALOG) 40 mg/mL injection 1 mL by Intra artICUlar route once for 1 dose. 1 mL 0  
 nabumetone (RELAFEN) 500 mg tablet Take 1 Tab by mouth daily. 60 Tab 1  
 PROAIR HFA 90 mcg/actuation inhaler INHALE 2 PUFFS BY MOUTH EVERY 4-6 HOURS AS NEEDED FOR WHEEZING AND COUGH  0  
 hydroCHLOROthiazide (HYDRODIURIL) 12.5 mg tablet TAKE 1 TAB BY MOUTH DAILY AS NEEDED FOR LEG SWELLING  3  
 VITAMIN D2 50,000 unit capsule Take 50,000 Units by mouth every seven (7) days.  bisoprolol-hydrochlorothiazide (ZIAC) 10-6.25 mg per tablet Take 1 Tab by mouth daily.  levothyroxine (SYNTHROID) 175 mcg tablet Take 175 mcg by mouth Daily (before breakfast).  sertraline (ZOLOFT) 100 mg tablet Take 100 mg by mouth daily.  letrozole (FEMARA) 2.5 mg tablet Take 2.5 mg by mouth daily.  citalopram (CELEXA) 20 mg tablet TAKE 1 TABLET BY MOUTH ONCE DAILY  3 No Known Allergies Past Surgical History:  
Procedure Laterality Date  HX ANKLE FRACTURE TX    
 HX GASTRIC BYPASS  HX PARTIAL THYROIDECTOMY  SURGERY OF BREAST CAPSULE Social History Socioeconomic History  Marital status:  Spouse name: Not on file  Number of children: Not on file  Years of education: Not on file  Highest education level: Not on file Occupational History  Not on file Social Needs  Financial resource strain: Not on file  Food insecurity:  
  Worry: Not on file Inability: Not on file  Transportation needs:  
  Medical: Not on file Non-medical: Not on file Tobacco Use  Smoking status: Never Smoker  Smokeless tobacco: Never Used Substance and Sexual Activity  Alcohol use: Yes Alcohol/week: 0.0 oz  Drug use: No  
 Sexual activity: Not on file Lifestyle  Physical activity:  
  Days per week: Not on file Minutes per session: Not on file  Stress: Not on file Relationships  Social connections:  
  Talks on phone: Not on file Gets together: Not on file Attends Bahai service: Not on file Active member of club or organization: Not on file Attends meetings of clubs or organizations: Not on file Relationship status: Not on file  Intimate partner violence:  
  Fear of current or ex partner: Not on file Emotionally abused: Not on file Physically abused: Not on file Forced sexual activity: Not on file Other Topics Concern  Not on file Social History Narrative  Not on file REVIEW OF SYSTEMS:   
 
CON: negative for recent weight loss/gain, fever, or chills EYE: negative for double or blurry vision ENT: negative for hoarseness RS:   negative for cough, URI, SOB 
CV:  negative for chest pain, palpitations GI:    negative for blood in stool, nausea/vomiting :  negative for blood in urine MS: As per HPI Other systems reviewed and noted below. PHYSICAL EXAMINATION: 
Visit Vitals /82 Pulse 61 Temp 97.5 °F (36.4 °C) Resp 16 Ht 5' 4\" (1.626 m) Wt 231 lb (104.8 kg) SpO2 94% BMI 39.65 kg/m² Body mass index is 39.65 kg/m². GENERAL: Alert and oriented x3, in no acute distress, well-developed, well-nourished. HEENT: Normocephalic, atraumatic. RESP: Non labored breathing with equal chest rise on inspiration. CV: Well perfused extremities. No cyanosis or clubbing noted. ABDOMEN: Soft, non-tender, non-distended. PHYSICAL EXAM:  Both knees with decreased range of motion due to pain. There is crepitus in both knees. She has some venous stasis ulcers and some mild erythema distally, likely related to venous stasis. No effusion, warmth or erythema. Neurovascularly intact distally. IMAGING:  X-rays of both knees were reviewed. These show bilateral knee arthritis. ASSESSMENT AND PLAN:  Hany Peacock is a 76year-old female with bilateral knee arthritis. We will try cortisone shots in both knees, as this has worked in the right knee in the past.  I will see her back as needed. [id number] [facility] [place], [facility] by [facility] PROCEDURE PROGRESS NOTE Chart reviewed for the following: 
 Nelson Rodriguez MD, have reviewed the History, Physical and updated the Allergic reactions for [facility] TIME OUT performed immediately prior to start of procedure: 
 Nelson Rodriguez MD, have performed the following reviews on [facility] prior to the start of the procedure: 
         
* Patient was identified by name and date of birth * Agreement on procedure being performed was verified * Risks and Benefits explained to the patient * Procedure site verified and marked as necessary * Patient was positioned for comfort * Consent was signed and verified Time: [id number] Date of procedure: 4/3/2019 Procedure performed by:  Briseyda Ozuna MD 
 
Provider assisted by: Jag Rosa LPN Patient assisted by: self How tolerated by patient: tolerated the procedure well with no complications Post Procedural Pain Scale: 0 - No Hurt Comments: none Electronically signed by: Robyn Figueroa MD

## 2019-10-23 ENCOUNTER — OFFICE VISIT (OUTPATIENT)
Dept: ORTHOPEDIC SURGERY | Age: 69
End: 2019-10-23

## 2019-10-23 VITALS
HEART RATE: 67 BPM | DIASTOLIC BLOOD PRESSURE: 66 MMHG | TEMPERATURE: 97.8 F | RESPIRATION RATE: 16 BRPM | OXYGEN SATURATION: 98 % | BODY MASS INDEX: 39.85 KG/M2 | SYSTOLIC BLOOD PRESSURE: 100 MMHG | WEIGHT: 233.4 LBS | HEIGHT: 64 IN

## 2019-10-23 DIAGNOSIS — M17.0 BILATERAL PRIMARY OSTEOARTHRITIS OF KNEE: Primary | ICD-10-CM

## 2019-10-23 RX ORDER — TRIAMCINOLONE ACETONIDE 40 MG/ML
40 INJECTION, SUSPENSION INTRA-ARTICULAR; INTRAMUSCULAR ONCE
Qty: 1 VIAL | Refills: 0
Start: 2019-10-23 | End: 2019-10-23

## 2019-10-23 NOTE — PROGRESS NOTES
Verbal order given by Dr. Jacinto Coma to draw up 1 cc of Kenalog and 3 cc of Xylocaine x2    Injection given in LANDY knee

## 2019-10-23 NOTE — PROGRESS NOTES
Patient: Anila Renee                MRN: [id number]       SSN: [id number]  YOB: 1950        AGE: 71 y.o. SEX: female  Body mass index is 40.06 kg/m². PCP: Deidre Kim MD  10/23/19    Chief Complaint: Bilateral knee pain    HISTORY OF PRESENT ILLNESS:  Lakhwinder Humphrey returns to the office today for her bilateral knee pain. She rates the pain as 6/10. At her last visit, she got an injection. She says that this helped for about six months. The right and the left knee pain are similar. She has not had any surgery on them. She continues to have symptoms that have returned. Past Medical History:   Diagnosis Date    Breast cancer ([facility] [address]        Family History   Problem Relation Age of Onset    No Known Problems Mother     No Known Problems Father        Current Outpatient Medications   Medication Sig Dispense Refill    triamcinolone acetonide (KENALOG-40) 40 mg/mL injection 1 mL by Intra artICUlar route once for 1 dose. 1 Vial 0    apixaban (ELIQUIS) 5 mg tablet Take 5 mg by mouth two (2) times a day.  cyanocobalamin 1,000 mcg tablet Take 1,000 mcg by mouth daily.  FOLIC ACID PO Take  by mouth.  nabumetone (RELAFEN) 500 mg tablet Take 1 Tab by mouth daily. 60 Tab 1    PROAIR HFA 90 mcg/actuation inhaler INHALE 2 PUFFS BY MOUTH EVERY 4-6 HOURS AS NEEDED FOR WHEEZING AND COUGH  0    hydroCHLOROthiazide (HYDRODIURIL) 12.5 mg tablet TAKE 1 TAB BY MOUTH DAILY AS NEEDED FOR LEG SWELLING  3    VITAMIN D2 50,000 unit capsule Take 50,000 Units by mouth every seven (7) days.  bisoprolol-hydrochlorothiazide (ZIAC) 10-6.25 mg per tablet Take 1 Tab by mouth daily.  levothyroxine (SYNTHROID) 175 mcg tablet Take 175 mcg by mouth Daily (before breakfast).  sertraline (ZOLOFT) 100 mg tablet Take 100 mg by mouth daily.  letrozole (FEMARA) 2.5 mg tablet Take 2.5 mg by mouth daily.       citalopram (CELEXA) 20 mg tablet TAKE 1 TABLET BY MOUTH ONCE DAILY  3 No Known Allergies    Past Surgical History:   Procedure Laterality Date    HX ANKLE FRACTURE TX      HX CATARACT REMOVAL      HX GASTRIC BYPASS      HX PARTIAL THYROIDECTOMY      SURGERY OF BREAST CAPSULE         Social History     Socioeconomic History    Marital status:      Spouse name: Not on file    Number of children: Not on file    Years of education: Not on file    Highest education level: Not on file   Occupational History    Not on file   Social Needs    Financial resource strain: Not on file    Food insecurity:     Worry: Not on file     Inability: Not on file    Transportation needs:     Medical: Not on file     Non-medical: Not on file   Tobacco Use    Smoking status: Never Smoker    Smokeless tobacco: Never Used   Substance and Sexual Activity    Alcohol use:  Yes     Alcohol/week: 0.0 standard drinks    Drug use: No    Sexual activity: Not on file   Lifestyle    Physical activity:     Days per week: Not on file     Minutes per session: Not on file    Stress: Not on file   Relationships    Social connections:     Talks on phone: Not on file     Gets together: Not on file     Attends [employer] service: Not on file     Active member of club or organization: Not on file     Attends meetings of clubs or organizations: Not on file     Relationship status: Not on file    Intimate partner violence:     Fear of current or ex partner: Not on file     Emotionally abused: Not on file     Physically abused: Not on file     Forced sexual activity: Not on file   Other Topics Concern     Service Not Asked    Blood Transfusions Not Asked    Caffeine Concern Not Asked    Occupational Exposure Not Asked   Jorge Kotyk Hazards Not Asked    Sleep Concern Not Asked    Stress Concern Not Asked    Weight Concern Not Asked    Special Diet Not Asked    Back Care Not Asked    Exercise Not Asked    Bike Helmet Not Asked   [address] Not Asked    Self-Exams Not Asked   Social History Narrative    Not on file       REVIEW OF SYSTEMS:      No changes from previous review of systems unless noted. PHYSICAL EXAMINATION:  Visit Vitals  /66 (BP 1 Location: Left arm, BP Patient Position: Sitting)   Pulse 67   Temp 97.8 °F (36.6 °C) (Oral)   Resp 16   Ht 5' 4\" (1.626 m)   Wt 233 lb 6.4 oz (105.9 kg)   SpO2 98%   BMI 40.06 kg/m²     Body mass index is 40.06 kg/m². GENERAL: Alert and oriented x3, in no acute distress. HEENT: Normocephalic, atraumatic. RESP: Non labored breathing. SKIN: No rashes or lesions noted. PHYSICAL EXAM:  Physical exam of both knees with crepitus with knee range of motion. She has some elephantiasis distally with some swelling about the distal legs. Nothing about the knee. No erythema around the knee. She is neurovascularly intact distally. ASSESSMENT AND PLAN:   Page  continues to have bilateral knee pain from her arthritis. She is not a very good candidate for surgery, although we did discuss that. She would like to try conservative measures and we will do injections into both of her knees today, which she tolerated without problems.         [facility]  OFFICE PROCEDURE PROGRESS NOTE        Chart reviewed for the following:   Jose Domínguez MD, have reviewed the History, Physical and updated the Allergic reactions for [address] [place] [address] performed immediately prior to start of procedure:   Jose Domínguez MD, have performed the following reviews on [address] [place] [address] prior to the start of the procedure:            * Patient was identified by name and date of birth   * Agreement on procedure being performed was verified  * Risks and Benefits explained to the patient  * Procedure site verified and marked as necessary  * Patient was positioned for comfort  * Consent was signed and verified    Time: 6054      Date of procedure: 10/23/2019    Procedure performed by:  Rupinder Brizuela MD    Provider assisted by: Geoffrey Vera LPN    Patient assisted by: self    How tolerated by patient: tolerated the procedure well with no complications    Post Procedural Pain Scale: 0 - No Hurt    Comments: none                  Electronically signed by: Denver Sarah, MD

## 2022-03-20 PROBLEM — E66.01 OBESITY, MORBID: Status: ACTIVE | Noted: 2017-12-26

## 2024-11-13 RX ORDER — CINACALCET 30 MG/1
30 TABLET, FILM COATED ORAL DAILY
COMMUNITY

## 2024-11-13 ASSESSMENT — PAIN - FUNCTIONAL ASSESSMENT: PAIN_FUNCTIONAL_ASSESSMENT: 0-10

## 2024-11-19 ENCOUNTER — ANESTHESIA EVENT (OUTPATIENT)
Facility: HOSPITAL | Age: 74
End: 2024-11-19
Payer: MEDICARE

## 2024-11-19 RX ORDER — DEXTROSE MONOHYDRATE 100 MG/ML
INJECTION, SOLUTION INTRAVENOUS CONTINUOUS PRN
Status: CANCELLED | OUTPATIENT
Start: 2024-11-19

## 2024-11-19 RX ORDER — LIDOCAINE HYDROCHLORIDE 10 MG/ML
5 INJECTION, SOLUTION EPIDURAL; INFILTRATION; INTRACAUDAL; PERINEURAL
Status: CANCELLED | OUTPATIENT
Start: 2024-11-19 | End: 2024-11-20

## 2024-11-19 RX ORDER — SODIUM CHLORIDE 0.9 % (FLUSH) 0.9 %
5-40 SYRINGE (ML) INJECTION PRN
Status: CANCELLED | OUTPATIENT
Start: 2024-11-19

## 2024-11-19 RX ORDER — FAMOTIDINE 20 MG/1
20 TABLET, FILM COATED ORAL ONCE
Status: CANCELLED | OUTPATIENT
Start: 2024-11-19 | End: 2024-11-19

## 2024-11-19 RX ORDER — INSULIN LISPRO 100 [IU]/ML
0-12 INJECTION, SOLUTION INTRAVENOUS; SUBCUTANEOUS ONCE
Status: CANCELLED | OUTPATIENT
Start: 2024-11-19 | End: 2024-11-19

## 2024-11-19 RX ORDER — MIDAZOLAM HYDROCHLORIDE 2 MG/2ML
2 INJECTION, SOLUTION INTRAMUSCULAR; INTRAVENOUS ONCE
Status: CANCELLED | OUTPATIENT
Start: 2024-11-19 | End: 2024-11-19

## 2024-11-19 RX ORDER — SODIUM CHLORIDE, SODIUM LACTATE, POTASSIUM CHLORIDE, CALCIUM CHLORIDE 600; 310; 30; 20 MG/100ML; MG/100ML; MG/100ML; MG/100ML
INJECTION, SOLUTION INTRAVENOUS CONTINUOUS
Status: CANCELLED | OUTPATIENT
Start: 2024-11-19

## 2024-11-19 RX ORDER — SODIUM CHLORIDE 9 MG/ML
INJECTION, SOLUTION INTRAVENOUS PRN
Status: CANCELLED | OUTPATIENT
Start: 2024-11-19

## 2024-11-19 RX ORDER — SODIUM CHLORIDE 0.9 % (FLUSH) 0.9 %
5-40 SYRINGE (ML) INJECTION EVERY 12 HOURS SCHEDULED
Status: CANCELLED | OUTPATIENT
Start: 2024-11-19

## 2024-11-19 RX ORDER — FENTANYL CITRATE 50 UG/ML
100 INJECTION, SOLUTION INTRAMUSCULAR; INTRAVENOUS ONCE
Status: CANCELLED | OUTPATIENT
Start: 2024-11-19 | End: 2024-11-19

## 2024-11-20 ENCOUNTER — HOSPITAL ENCOUNTER (OUTPATIENT)
Facility: HOSPITAL | Age: 74
Setting detail: OUTPATIENT SURGERY
Discharge: HOME | End: 2024-11-20
Attending: DENTIST | Admitting: DENTIST
Payer: MEDICARE

## 2024-11-20 ENCOUNTER — ANESTHESIA (OUTPATIENT)
Facility: HOSPITAL | Age: 74
End: 2024-11-20
Payer: MEDICARE

## 2024-11-20 VITALS
OXYGEN SATURATION: 96 % | WEIGHT: 188 LBS | TEMPERATURE: 98.9 F | HEART RATE: 88 BPM | RESPIRATION RATE: 17 BRPM | DIASTOLIC BLOOD PRESSURE: 66 MMHG | SYSTOLIC BLOOD PRESSURE: 119 MMHG | HEIGHT: 66 IN | BODY MASS INDEX: 30.22 KG/M2

## 2024-11-20 LAB
INR PPP: 2.1 (ref 0.9–1.1)
PROTHROMBIN TIME: 24 SEC (ref 11.9–14.9)

## 2024-11-20 PROCEDURE — 3700000001 HC ADD 15 MINUTES (ANESTHESIA): Performed by: DENTIST

## 2024-11-20 PROCEDURE — 7100000011 HC PHASE II RECOVERY - ADDTL 15 MIN: Performed by: DENTIST

## 2024-11-20 PROCEDURE — 3600000012 HC SURGERY LEVEL 2 ADDTL 15MIN: Performed by: DENTIST

## 2024-11-20 PROCEDURE — 2500000003 HC RX 250 WO HCPCS: Performed by: DENTIST

## 2024-11-20 PROCEDURE — 7100000000 HC PACU RECOVERY - FIRST 15 MIN: Performed by: DENTIST

## 2024-11-20 PROCEDURE — 2580000003 HC RX 258: Performed by: STUDENT IN AN ORGANIZED HEALTH CARE EDUCATION/TRAINING PROGRAM

## 2024-11-20 PROCEDURE — 85610 PROTHROMBIN TIME: CPT

## 2024-11-20 PROCEDURE — 2580000003 HC RX 258: Performed by: DENTIST

## 2024-11-20 PROCEDURE — 2500000003 HC RX 250 WO HCPCS

## 2024-11-20 PROCEDURE — 7100000010 HC PHASE II RECOVERY - FIRST 15 MIN: Performed by: DENTIST

## 2024-11-20 PROCEDURE — 7100000001 HC PACU RECOVERY - ADDTL 15 MIN: Performed by: DENTIST

## 2024-11-20 PROCEDURE — 2709999900 HC NON-CHARGEABLE SUPPLY: Performed by: DENTIST

## 2024-11-20 PROCEDURE — 6360000002 HC RX W HCPCS

## 2024-11-20 PROCEDURE — A4217 STERILE WATER/SALINE, 500 ML: HCPCS | Performed by: DENTIST

## 2024-11-20 PROCEDURE — 3700000000 HC ANESTHESIA ATTENDED CARE: Performed by: DENTIST

## 2024-11-20 PROCEDURE — 3600000002 HC SURGERY LEVEL 2 BASE: Performed by: DENTIST

## 2024-11-20 RX ORDER — ONDANSETRON 2 MG/ML
INJECTION INTRAMUSCULAR; INTRAVENOUS
Status: DISCONTINUED | OUTPATIENT
Start: 2024-11-20 | End: 2024-11-20 | Stop reason: SDUPTHER

## 2024-11-20 RX ORDER — NALOXONE HYDROCHLORIDE 0.4 MG/ML
INJECTION, SOLUTION INTRAMUSCULAR; INTRAVENOUS; SUBCUTANEOUS PRN
Status: DISCONTINUED | OUTPATIENT
Start: 2024-11-20 | End: 2024-11-20 | Stop reason: HOSPADM

## 2024-11-20 RX ORDER — AMINOCAPROIC ACID 0.25 G/ML
3.75 SYRUP ORAL EVERY 6 HOURS PRN
Status: DISCONTINUED | OUTPATIENT
Start: 2024-11-20 | End: 2024-11-20 | Stop reason: HOSPADM

## 2024-11-20 RX ORDER — PROCHLORPERAZINE EDISYLATE 5 MG/ML
5 INJECTION INTRAMUSCULAR; INTRAVENOUS
Status: DISCONTINUED | OUTPATIENT
Start: 2024-11-20 | End: 2024-11-20 | Stop reason: HOSPADM

## 2024-11-20 RX ORDER — LIDOCAINE HYDROCHLORIDE 20 MG/ML
INJECTION, SOLUTION EPIDURAL; INFILTRATION; INTRACAUDAL; PERINEURAL
Status: DISCONTINUED | OUTPATIENT
Start: 2024-11-20 | End: 2024-11-20 | Stop reason: SDUPTHER

## 2024-11-20 RX ORDER — TRANEXAMIC ACID 650 MG/1
650 TABLET ORAL ONCE
Qty: 5 TABLET | Refills: 1 | Status: SHIPPED | OUTPATIENT
Start: 2024-11-20 | End: 2024-11-20

## 2024-11-20 RX ORDER — DIPHENHYDRAMINE HYDROCHLORIDE 50 MG/ML
12.5 INJECTION INTRAMUSCULAR; INTRAVENOUS
Status: DISCONTINUED | OUTPATIENT
Start: 2024-11-20 | End: 2024-11-20 | Stop reason: HOSPADM

## 2024-11-20 RX ORDER — CLINDAMYCIN HYDROCHLORIDE 300 MG/1
300 CAPSULE ORAL 3 TIMES DAILY
Qty: 21 CAPSULE | Refills: 0 | Status: SHIPPED | OUTPATIENT
Start: 2024-11-20 | End: 2024-11-27

## 2024-11-20 RX ORDER — IPRATROPIUM BROMIDE AND ALBUTEROL SULFATE 2.5; .5 MG/3ML; MG/3ML
1 SOLUTION RESPIRATORY (INHALATION)
Status: DISCONTINUED | OUTPATIENT
Start: 2024-11-20 | End: 2024-11-20 | Stop reason: HOSPADM

## 2024-11-20 RX ORDER — KETOROLAC TROMETHAMINE 15 MG/ML
15 INJECTION, SOLUTION INTRAMUSCULAR; INTRAVENOUS ONCE
Status: DISCONTINUED | OUTPATIENT
Start: 2024-11-20 | End: 2024-11-20

## 2024-11-20 RX ORDER — HYDRALAZINE HYDROCHLORIDE 20 MG/ML
10 INJECTION INTRAMUSCULAR; INTRAVENOUS
Status: DISCONTINUED | OUTPATIENT
Start: 2024-11-20 | End: 2024-11-20 | Stop reason: HOSPADM

## 2024-11-20 RX ORDER — FENTANYL CITRATE 50 UG/ML
25 INJECTION, SOLUTION INTRAMUSCULAR; INTRAVENOUS EVERY 5 MIN PRN
Status: DISCONTINUED | OUTPATIENT
Start: 2024-11-20 | End: 2024-11-20 | Stop reason: HOSPADM

## 2024-11-20 RX ORDER — SODIUM CHLORIDE, SODIUM LACTATE, POTASSIUM CHLORIDE, CALCIUM CHLORIDE 600; 310; 30; 20 MG/100ML; MG/100ML; MG/100ML; MG/100ML
INJECTION, SOLUTION INTRAVENOUS CONTINUOUS
Status: DISCONTINUED | OUTPATIENT
Start: 2024-11-20 | End: 2024-11-20 | Stop reason: HOSPADM

## 2024-11-20 RX ORDER — SUCCINYLCHOLINE/SOD CL,ISO/PF 100 MG/5ML
SYRINGE (ML) INTRAVENOUS
Status: DISCONTINUED | OUTPATIENT
Start: 2024-11-20 | End: 2024-11-20 | Stop reason: SDUPTHER

## 2024-11-20 RX ORDER — LIDOCAINE HYDROCHLORIDE AND EPINEPHRINE BITARTRATE 20; .01 MG/ML; MG/ML
INJECTION, SOLUTION SUBCUTANEOUS PRN
Status: DISCONTINUED | OUTPATIENT
Start: 2024-11-20 | End: 2024-11-20 | Stop reason: HOSPADM

## 2024-11-20 RX ORDER — SODIUM CHLORIDE 0.9 % (FLUSH) 0.9 %
5-40 SYRINGE (ML) INJECTION EVERY 12 HOURS SCHEDULED
Status: DISCONTINUED | OUTPATIENT
Start: 2024-11-20 | End: 2024-11-20 | Stop reason: HOSPADM

## 2024-11-20 RX ORDER — DEXAMETHASONE SODIUM PHOSPHATE 4 MG/ML
INJECTION, SOLUTION INTRA-ARTICULAR; INTRALESIONAL; INTRAMUSCULAR; INTRAVENOUS; SOFT TISSUE
Status: DISCONTINUED | OUTPATIENT
Start: 2024-11-20 | End: 2024-11-20 | Stop reason: SDUPTHER

## 2024-11-20 RX ORDER — PROPOFOL 10 MG/ML
INJECTION, EMULSION INTRAVENOUS
Status: DISCONTINUED | OUTPATIENT
Start: 2024-11-20 | End: 2024-11-20 | Stop reason: SDUPTHER

## 2024-11-20 RX ORDER — ONDANSETRON 2 MG/ML
4 INJECTION INTRAMUSCULAR; INTRAVENOUS
Status: DISCONTINUED | OUTPATIENT
Start: 2024-11-20 | End: 2024-11-20 | Stop reason: HOSPADM

## 2024-11-20 RX ORDER — SODIUM CHLORIDE 0.9 % (FLUSH) 0.9 %
5-40 SYRINGE (ML) INJECTION PRN
Status: DISCONTINUED | OUTPATIENT
Start: 2024-11-20 | End: 2024-11-20 | Stop reason: HOSPADM

## 2024-11-20 RX ORDER — FENTANYL CITRATE 50 UG/ML
50 INJECTION, SOLUTION INTRAMUSCULAR; INTRAVENOUS EVERY 5 MIN PRN
Status: DISCONTINUED | OUTPATIENT
Start: 2024-11-20 | End: 2024-11-20 | Stop reason: HOSPADM

## 2024-11-20 RX ORDER — MAGNESIUM HYDROXIDE 1200 MG/15ML
LIQUID ORAL CONTINUOUS PRN
Status: DISCONTINUED | OUTPATIENT
Start: 2024-11-20 | End: 2024-11-20 | Stop reason: HOSPADM

## 2024-11-20 RX ORDER — OXYMETAZOLINE HYDROCHLORIDE 0.05 G/100ML
2 SPRAY NASAL 2 TIMES DAILY
Status: DISCONTINUED | OUTPATIENT
Start: 2024-11-20 | End: 2024-11-20 | Stop reason: HOSPADM

## 2024-11-20 RX ORDER — LABETALOL HYDROCHLORIDE 5 MG/ML
10 INJECTION, SOLUTION INTRAVENOUS
Status: DISCONTINUED | OUTPATIENT
Start: 2024-11-20 | End: 2024-11-20 | Stop reason: HOSPADM

## 2024-11-20 RX ORDER — SODIUM CHLORIDE 9 MG/ML
INJECTION, SOLUTION INTRAVENOUS PRN
Status: DISCONTINUED | OUTPATIENT
Start: 2024-11-20 | End: 2024-11-20 | Stop reason: HOSPADM

## 2024-11-20 RX ORDER — OXYCODONE AND ACETAMINOPHEN 5; 325 MG/1; MG/1
1 TABLET ORAL ONCE
Status: DISCONTINUED | OUTPATIENT
Start: 2024-11-20 | End: 2024-11-20

## 2024-11-20 RX ADMIN — Medication 100 MG: at 09:00

## 2024-11-20 RX ADMIN — LIDOCAINE HYDROCHLORIDE 80 MG: 20 INJECTION, SOLUTION EPIDURAL; INFILTRATION; INTRACAUDAL; PERINEURAL at 09:00

## 2024-11-20 RX ADMIN — DEXAMETHASONE SODIUM PHOSPHATE 4 MG: 4 INJECTION INTRA-ARTICULAR; INTRALESIONAL; INTRAMUSCULAR; INTRAVENOUS; SOFT TISSUE at 09:13

## 2024-11-20 RX ADMIN — SODIUM CHLORIDE, POTASSIUM CHLORIDE, SODIUM LACTATE AND CALCIUM CHLORIDE: 600; 310; 30; 20 INJECTION, SOLUTION INTRAVENOUS at 07:38

## 2024-11-20 RX ADMIN — ONDANSETRON 4 MG: 2 INJECTION INTRAMUSCULAR; INTRAVENOUS at 09:13

## 2024-11-20 RX ADMIN — PROPOFOL 100 MG: 10 INJECTION, EMULSION INTRAVENOUS at 09:00

## 2024-11-20 RX ADMIN — PROPOFOL 50 MG: 10 INJECTION, EMULSION INTRAVENOUS at 09:09

## 2024-11-20 ASSESSMENT — PAIN SCALES - GENERAL: PAINLEVEL_OUTOF10: 0

## 2024-11-20 ASSESSMENT — PAIN - FUNCTIONAL ASSESSMENT: PAIN_FUNCTIONAL_ASSESSMENT: NONE - DENIES PAIN

## 2024-11-20 NOTE — H&P
Day of Surgery Update:  Aysha Castle was seen and examined.  History and physical has been reviewed. The patient has been examined. There have been no significant clinical changes since the completion of the originally dated History and Physical.    Cleared by cardiology and PMD.   Pt instructed by MD to stop coumadin 5 days preop.  Stat INR today taken.      H&P from physician scanned into system    STAT INR 2.1.  pt has been off 5 days.  Daughter said was mid high 3 before stopped.  Explained would prefer slightly lower but will proceed since local infection present.  Explained post op could have some extra oozing bleeding and will provide mouthrinse.  Signed By: Tushar Garces DDS     November 20, 2024 7:50 AM

## 2024-11-20 NOTE — PERIOP NOTE
Patient /Family /Designee has been informed that [facility] is not responsible for patient belongings per policy and the signed [facility] Patient Agreement document.  Personal items should be sent home or checked in with security.  Patient /Family /Designee selected the following action:                            [x]  Send personal items home with a family member or friend                                                 []  Check in personal items with security, excluding clothing                            []  Maintain personal items at the bedside, against recommendation                                 by Nelson Munguia [facility]                                All personal items given to Daughter  Darline Mann at the bedside

## 2024-11-20 NOTE — DISCHARGE INSTRUCTIONS
[facility]  Call office [phone] with any questions or visit [email or web address]    DAY OF SURGERY:  Immediately after surgery. Patients who received a general anesthetic should return home from the office immediately upon discharge, and lie down with the head elevated until all the effects of the anesthetic has disappeared.  Anesthetic effects vary by individual, and you may feel drowsy for a short period of time or for several hours.  You should not operate any mechanical equipment or drive a motor vehicle for at least 12 hours or longer if you feel any residual effect from the anesthetic.  1. Do not drive or use appliances or equipment that could be dangerous, suck as power tools, stoves, burners,  and garbage disposals.  2. Watch our for dizziness. Walk slowly and take your time. Sudden changes of position can also cause nausea.  3. Do not make any important decisions. You may change your mind tomorrow.  4. Do not drink any alcoholic beverages. The drugs in your body may cause your reaction to alcohol to be dangerous.  5. Diet: If you feel nauseated or sick to your stomach, drink clear liquids like 7-up, broth, apple juice, ginger ale, tea or cola or eat jello. If these liquids do not make you sick to your stomach try eating soft foods like potatoes, rice, pasta and cereal.  6. Discuss any questions you may have with your surgeon, Dr. Garces or Dr. Pedersen, who can be reached at [phone].  7. In the event of a medical emergency, call 911.    ORAL HYGIENE AND CARE: Do not disturb the surgical area today. Bite down gently but firmly on the gauze pack that we have initially placed over the surgical area making sure that they remain in place. Do not change them for the first hour unless the bleeding is not being controlled. This is important to allow blood clot formation on the surgical site. The gauze may be changed when necessary and/or repositioned for comfort. DO

## 2024-11-20 NOTE — OP NOTE
[unfilled]   Operative Report    Patient: Aysha Castle MRN: [id number]  SSN: [id number]    YOB: 1950  Age: 74 y.o.  Sex: female       Date of Surgery: [unfilled]     Preoperative Diagnosis: Impacted tooth [K01.1]     Postoperative Diagnosis: * No post-op diagnosis entered *     Surgeons and Role:     * Tushar Garces DDS - Primary    Anesthesia: General     Procedure: Procedure(s):  EXTRACT Roots of TEETH #4,5,6,7,8,9,10,11,12,15,19,20,21,22,23,24,25,26,28,29  Hospital visit    Follow up: The patient was instructed to follow up at the office in one week.  The patient was given appropriate postoperative prescriptions with directions.     Preop/Indications:  The patient was seen previously on an outpatient basis.  Following physical examination and review of medical history it was recommended to be done in outpatient hospital setting with intubated general anesthesia.        PREOP:    H & P reviewed - no changes from consultation appointment, Consent confirmed signed and tooth numbers confirmed with patient. PATIENT STILL SYMPTOMATIC, Escort Present, Patient NPO 8 hrs, Timeout performed: confirmed patient name, treatment plan, and medical issues.    Procedure in Detail:   After the patient was properly identified by Anesthesia, appropriately consented, the patient was taken back to the operating room.  Via smooth IV induction, the patient was intubated atraumatically.  The patient was turned over to Oral Surgery, prepped and draped in a normal sterilel fashion for intraoral surgical procedures.  The mouth was thoroughly suctioned and inspected with the Yankauer suction.  A throat pack was Placed. Anesthesia was informed that the throat pack was placed.  A total of 15 cc 2% xylocaine with 100,000 epinephrine was infiltrated for bilateral mandibular and Maxillary blocks.     Attention was focused to the patients Mandible.  A distobucal incision was made with a 15 blade extending to tooth 19-29.  A

## 2024-11-20 NOTE — ANESTHESIA POSTPROCEDURE EVALUATION
Department of Anesthesiology  Postprocedure Note    Patient: Aysha Castle  MRN: [id number]  YOB: 1950  Date of evaluation: 11/20/2024    Procedure Summary       Date: 11/20/24 Room / Location: [facility] MAIN 01 / [facility] MAIN OR    Anesthesia Start: 0852 Anesthesia Stop: 0938    Procedure: EXTRACT TEETH #4,5,6,7,8,9,10,11,12,15,19,20,21,22,23,24,25,26,28,29 (Mouth) Diagnosis:       Impacted tooth      (Impacted tooth [K01.1])    Surgeons: Tushar Garces DDS Responsible Provider:     Anesthesia Type: general ASA Status: 3            Anesthesia Type: No value filed.    Shannan Phase I: Shannan Score: 10    Shannan Phase II:      Anesthesia Post Evaluation    Patient location during evaluation: PACU  Patient participation: complete - patient participated  Level of consciousness: sleepy but conscious  Pain score: 0  Airway patency: patent  Nausea & Vomiting: no nausea and no vomiting  Cardiovascular status: blood pressure returned to baseline  Respiratory status: acceptable  Hydration status: euvolemic  Pain management: adequate    No notable events documented.

## 2024-11-20 NOTE — DISCHARGE SUMMARY
[unfilled]    Discharge Summary     Patient: Aysha Castle MRN: [id number]  SSN: [id number]    YOB: 1950  Age: 74 y.o.  Sex: female       Admit Date: 11/20/2024    Discharge Date: 11/20/2024      Admission Diagnoses: Impacted tooth [K01.1]    Discharge Diagnoses:      Discharge Condition: Good    Hospital Course: sds    Consults: None    Significant Diagnostic Studies: labs:     Disposition: home    Discharge Medications:   Current Discharge Medication List        CONTINUE these medications which have NOT CHANGED    Details   cinacalcet (SENSIPAR) 30 MG tablet Take 1 tablet by mouth daily      acetaminophen (TYLENOL) 325 MG tablet Take 2 tablets by mouth every 4 hours as needed      albuterol sulfate HFA (PROVENTIL HFA) 108 (90 Base) MCG/ACT inhaler Inhale into the lungs every 6 hours as needed      Cholecalciferol 50 MCG (2000 UT) TABS Take 1 tablet by mouth daily      sublingual tablet cyanocobalamin 2500 MCG SUBL Place 1 tablet every day by sublingual route.      furosemide (LASIX) 20 MG tablet TAKE 1 TABLET BY MOUTH TWICE DAILY FOR DIURETIC      hydroCHLOROthiazide 12.5 MG capsule Take 1 capsule by mouth every morning      levothyroxine (SYNTHROID) 125 MCG tablet Take 1 tablet by mouth Daily      metoprolol tartrate (LOPRESSOR) 25 MG tablet Take 0.5 tablets by mouth 2 times daily      sertraline (ZOLOFT) 100 MG tablet Take 1 tablet by mouth daily      umeclidinium-vilanterol (ANORO ELLIPTA) 62.5-25 MCG/ACT inhaler Inhale 1 puff into the lungs daily      ferrous sulfate (IRON 325) 325 (65 Fe) MG tablet Take 1 tablet by mouth daily (with breakfast)      zoledronic acid (RECLAST) 5 MG/100ML SOLN Infuse 100 mLs intravenously once Every 12 months      warfarin (COUMADIN) 5 MG tablet Take 1 tablet by mouth daily             Activity: activity as tolerated and no driving for today  Diet: clear liquids, advance as tolerated  Wound Care: as directed    No follow-ups on file.    Signed By: Tushar EGAN

## 2024-11-20 NOTE — ANESTHESIA PRE PROCEDURE
Department of Anesthesiology  Preprocedure Note       Name:  Aysha Castle   Age:  74 y.o.  :  1950                                          MRN:  [id number]         Date:  2024      Surgeon: Surgeon(s):  Tushar Garces DDS    Procedure: Procedure(s):  EXTRACT TEETH #4,5,6,7,8,9,10,11,12,15,19,20,21,22,23,24,25,26,28,29    Medications prior to admission:   Prior to Admission medications    Medication Sig Start Date End Date Taking? Authorizing Provider   cinacalcet (SENSIPAR) 30 MG tablet Take 1 tablet by mouth daily   Yes ProviderSharon MD   acetaminophen (TYLENOL) 325 MG tablet Take 2 tablets by mouth every 4 hours as needed 3/4/22  Yes Sharon Norwood MD   albuterol sulfate HFA (PROVENTIL HFA) 108 (90 Base) MCG/ACT inhaler Inhale into the lungs every 6 hours as needed 17  Yes ProviderSharon MD   Cholecalciferol 50 MCG (2000 UT) TABS Take 1 tablet by mouth daily   Yes ProviderSharon MD   sublingual tablet cyanocobalamin 2500 MCG SUBL Place 1 tablet every day by sublingual route. 24  Yes ProviderSharon MD   furosemide (LASIX) 20 MG tablet TAKE 1 TABLET BY MOUTH TWICE DAILY FOR DIURETIC 24  Yes Provider, MD Sharon   hydroCHLOROthiazide 12.5 MG capsule Take 1 capsule by mouth every morning   Yes Sharon Norwood MD   levothyroxine (SYNTHROID) 125 MCG tablet Take 1 tablet by mouth Daily 23  Yes ProviderShraon MD   metoprolol tartrate (LOPRESSOR) 25 MG tablet Take 0.5 tablets by mouth 2 times daily 24  Yes ProviderSharon MD   sertraline (ZOLOFT) 100 MG tablet Take 1 tablet by mouth daily 3/5/22  Yes Sharon Norwood MD   umeclidinium-vilanterol (ANORO ELLIPTA) 62.5-25 MCG/ACT inhaler Inhale 1 puff into the lungs daily 21  Yes Sharon Norwood MD   ferrous sulfate (IRON 325) 325 (65 Fe) MG tablet Take 1 tablet by mouth daily (with breakfast)   Yes Sharon Norwood MD   zoledronic acid (RECLAST) 5

## 2024-11-20 NOTE — BRIEF OP NOTE
Brief Postoperative Note      Patient: Aysha Castle  YOB: 1950  MRN: [id number]    Date of Procedure: 11/20/2024    Pre-Op Diagnosis Codes:      * Impacted tooth [K01.1]    Post-Op Diagnosis: Same       Procedure(s):  EXTRACT TEETH #4,5,6,7,8,9,10,11,12,15,19,20,21,22,23,24,25,26,28,29    Surgeon(s):  Tushar Garces DDS    Assistant:  * No surgical staff found *    Anesthesia: General    Estimated Blood Loss (mL): Minimal    Complications: None    Specimens:   * No specimens in log *    Implants:  * No implants in log *      Drains: * No LDAs found *    Findings:  Infection Present At Time Of Surgery (PATOS) (choose all levels that have infection present):  Localized infection   Other Findings: severe caries abscessed teeth    Electronically signed by Tushar Garces DDS on 11/20/2024 at 9:35 AM

## (undated) DEVICE — SURGIFOAM SPNG SZ 12-7

## (undated) DEVICE — SYRINGE MED 10ML TRNSLUC BRL PLUNG BLK MRK POLYPR CTRL

## (undated) DEVICE — SYRINGE 20ML LL S/C 50

## (undated) DEVICE — MEDI-VAC SUCTION HANDLE REGULAR CAPACITY: Brand: CARDINAL HEALTH

## (undated) DEVICE — KENDALL SCD EXPRESS SLEEVES, KNEE LENGTH, MEDIUM: Brand: KENDALL SCD

## (undated) DEVICE — 10FR FRAZIER SUCTION HANDLE: Brand: CARDINAL HEALTH

## (undated) DEVICE — SHEET, DRAPE, SPLIT, STERILE: Brand: MEDLINE

## (undated) DEVICE — STERILE LATEX POWDER-FREE SURGICAL GLOVESWITH NITRILE COATING: Brand: PROTEXIS

## (undated) DEVICE — SUTURE VICRYL + SZ 4-0 L27IN ABSRB UD PS-2 3/8 CIR REV CUT VCP426H

## (undated) DEVICE — MAGNETIC INSTRUMENT PAD 10" X 16"; MEDIUM; DISPOSABLE: Brand: CARDINAL HEALTH

## (undated) DEVICE — SPONGE GZ W4XL4IN COT 12 PLY TYP VII WVN C FLD DSGN STERILE

## (undated) DEVICE — SOLUTION IRRIG 1000ML 0.9% SOD CHL USP POUR PLAS BTL

## (undated) DEVICE — BLADE,STAINLESS-STEEL,15,STRL,DISPOSABLE: Brand: MEDLINE

## (undated) DEVICE — CANNULA PERF L2IN BLNT TIP 2MM VES CLR RADPQ BODY FEM LUER

## (undated) DEVICE — MAJOR: Brand: MEDLINE INDUSTRIES, INC.

## (undated) DEVICE — PACKING GZ W2INXL6FT WVN COT VAG RADPQ

## (undated) DEVICE — NEEDLE HYPO 25GA L1.5IN BLU POLYPR HUB S STL REG BVL STR